# Patient Record
Sex: FEMALE | Race: WHITE | NOT HISPANIC OR LATINO | Employment: OTHER | URBAN - METROPOLITAN AREA
[De-identification: names, ages, dates, MRNs, and addresses within clinical notes are randomized per-mention and may not be internally consistent; named-entity substitution may affect disease eponyms.]

---

## 2020-02-26 ENCOUNTER — APPOINTMENT (OUTPATIENT)
Dept: GENERAL RADIOLOGY | Facility: HOSPITAL | Age: 80
End: 2020-02-26

## 2020-02-26 ENCOUNTER — APPOINTMENT (OUTPATIENT)
Dept: CT IMAGING | Facility: HOSPITAL | Age: 80
End: 2020-02-26

## 2020-02-26 ENCOUNTER — HOSPITAL ENCOUNTER (EMERGENCY)
Facility: HOSPITAL | Age: 80
Discharge: HOME | End: 2020-02-26
Attending: EMERGENCY MEDICINE | Admitting: EMERGENCY MEDICINE

## 2020-02-26 VITALS
TEMPERATURE: 98.9 F | BODY MASS INDEX: 22.29 KG/M2 | OXYGEN SATURATION: 93 % | HEART RATE: 76 BPM | HEIGHT: 67 IN | WEIGHT: 142 LBS | SYSTOLIC BLOOD PRESSURE: 131 MMHG | DIASTOLIC BLOOD PRESSURE: 67 MMHG | RESPIRATION RATE: 12 BRPM

## 2020-02-26 DIAGNOSIS — J18.9 COMMUNITY ACQUIRED PNEUMONIA, UNSPECIFIED LATERALITY: ICD-10-CM

## 2020-02-26 DIAGNOSIS — R93.89 ABNORMAL CHEST CT: ICD-10-CM

## 2020-02-26 DIAGNOSIS — N30.90 CYSTITIS: Primary | ICD-10-CM

## 2020-02-26 LAB
ALBUMIN SERPL-MCNC: 3.7 G/DL (ref 3.5–5.2)
ALBUMIN/GLOB SERPL: 0.9 G/DL
ALP SERPL-CCNC: 99 U/L (ref 39–117)
ALT SERPL W P-5'-P-CCNC: 20 U/L (ref 1–33)
ANION GAP SERPL CALCULATED.3IONS-SCNC: 11.1 MMOL/L (ref 5–15)
AST SERPL-CCNC: 26 U/L (ref 1–32)
BACTERIA UR QL AUTO: ABNORMAL /HPF
BASOPHILS # BLD AUTO: 0.01 10*3/MM3 (ref 0–0.2)
BASOPHILS NFR BLD AUTO: 0.1 % (ref 0–1.5)
BILIRUB SERPL-MCNC: 0.4 MG/DL (ref 0.2–1.2)
BILIRUB UR QL STRIP: NEGATIVE
BUN BLD-MCNC: 26 MG/DL (ref 8–23)
BUN/CREAT SERPL: 31 (ref 7–25)
CALCIUM SPEC-SCNC: 8.9 MG/DL (ref 8.6–10.5)
CHLORIDE SERPL-SCNC: 101 MMOL/L (ref 98–107)
CLARITY UR: ABNORMAL
CO2 SERPL-SCNC: 28.9 MMOL/L (ref 22–29)
COLOR UR: YELLOW
CREAT BLD-MCNC: 0.84 MG/DL (ref 0.57–1)
DEPRECATED RDW RBC AUTO: 43.3 FL (ref 37–54)
EOSINOPHIL # BLD AUTO: 0.13 10*3/MM3 (ref 0–0.4)
EOSINOPHIL NFR BLD AUTO: 1.9 % (ref 0.3–6.2)
ERYTHROCYTE [DISTWIDTH] IN BLOOD BY AUTOMATED COUNT: 13.7 % (ref 12.3–15.4)
GFR SERPL CREATININE-BSD FRML MDRD: 66 ML/MIN/1.73
GLOBULIN UR ELPH-MCNC: 4.1 GM/DL
GLUCOSE BLD-MCNC: 103 MG/DL (ref 65–99)
GLUCOSE BLDC GLUCOMTR-MCNC: 84 MG/DL (ref 70–130)
GLUCOSE UR STRIP-MCNC: NEGATIVE MG/DL
HCT VFR BLD AUTO: 41.2 % (ref 34–46.6)
HGB BLD-MCNC: 13.4 G/DL (ref 12–15.9)
HGB UR QL STRIP.AUTO: NEGATIVE
HOLD SPECIMEN: NORMAL
HOLD SPECIMEN: NORMAL
HYALINE CASTS UR QL AUTO: ABNORMAL /LPF
IMM GRANULOCYTES # BLD AUTO: 0.04 10*3/MM3 (ref 0–0.05)
IMM GRANULOCYTES NFR BLD AUTO: 0.6 % (ref 0–0.5)
KETONES UR QL STRIP: NEGATIVE
LEUKOCYTE ESTERASE UR QL STRIP.AUTO: ABNORMAL
LYMPHOCYTES # BLD AUTO: 1.43 10*3/MM3 (ref 0.7–3.1)
LYMPHOCYTES NFR BLD AUTO: 20.8 % (ref 19.6–45.3)
MAGNESIUM SERPL-MCNC: 2.3 MG/DL (ref 1.6–2.4)
MCH RBC QN AUTO: 28.6 PG (ref 26.6–33)
MCHC RBC AUTO-ENTMCNC: 32.5 G/DL (ref 31.5–35.7)
MCV RBC AUTO: 87.8 FL (ref 79–97)
MONOCYTES # BLD AUTO: 0.85 10*3/MM3 (ref 0.1–0.9)
MONOCYTES NFR BLD AUTO: 12.4 % (ref 5–12)
NEUTROPHILS # BLD AUTO: 4.41 10*3/MM3 (ref 1.7–7)
NEUTROPHILS NFR BLD AUTO: 64.2 % (ref 42.7–76)
NITRITE UR QL STRIP: POSITIVE
NRBC BLD AUTO-RTO: 0 /100 WBC (ref 0–0.2)
PH UR STRIP.AUTO: 8 [PH] (ref 5–8)
PLATELET # BLD AUTO: 229 10*3/MM3 (ref 140–450)
PMV BLD AUTO: 10.2 FL (ref 6–12)
POTASSIUM BLD-SCNC: 4.1 MMOL/L (ref 3.5–5.2)
PROT SERPL-MCNC: 7.8 G/DL (ref 6–8.5)
PROT UR QL STRIP: ABNORMAL
RBC # BLD AUTO: 4.69 10*6/MM3 (ref 3.77–5.28)
RBC # UR: ABNORMAL /HPF
REF LAB TEST METHOD: ABNORMAL
SODIUM BLD-SCNC: 141 MMOL/L (ref 136–145)
SP GR UR STRIP: 1.02 (ref 1–1.03)
SQUAMOUS #/AREA URNS HPF: ABNORMAL /HPF
TROPONIN T SERPL-MCNC: 0.01 NG/ML (ref 0–0.03)
UROBILINOGEN UR QL STRIP: ABNORMAL
VALPROATE SERPL-MCNC: 45 MCG/ML (ref 50–125)
WBC NRBC COR # BLD: 6.87 10*3/MM3 (ref 3.4–10.8)
WBC UR QL AUTO: ABNORMAL /HPF
WHOLE BLOOD HOLD SPECIMEN: NORMAL
WHOLE BLOOD HOLD SPECIMEN: NORMAL

## 2020-02-26 PROCEDURE — 25010000002 CEFTRIAXONE PER 250 MG: Performed by: EMERGENCY MEDICINE

## 2020-02-26 PROCEDURE — 80053 COMPREHEN METABOLIC PANEL: CPT | Performed by: EMERGENCY MEDICINE

## 2020-02-26 PROCEDURE — 71045 X-RAY EXAM CHEST 1 VIEW: CPT

## 2020-02-26 PROCEDURE — 80164 ASSAY DIPROPYLACETIC ACD TOT: CPT | Performed by: EMERGENCY MEDICINE

## 2020-02-26 PROCEDURE — 82962 GLUCOSE BLOOD TEST: CPT

## 2020-02-26 PROCEDURE — 87186 SC STD MICRODIL/AGAR DIL: CPT | Performed by: EMERGENCY MEDICINE

## 2020-02-26 PROCEDURE — 72125 CT NECK SPINE W/O DYE: CPT

## 2020-02-26 PROCEDURE — 87086 URINE CULTURE/COLONY COUNT: CPT | Performed by: EMERGENCY MEDICINE

## 2020-02-26 PROCEDURE — 71250 CT THORAX DX C-: CPT

## 2020-02-26 PROCEDURE — 93010 ELECTROCARDIOGRAM REPORT: CPT | Performed by: INTERNAL MEDICINE

## 2020-02-26 PROCEDURE — 84484 ASSAY OF TROPONIN QUANT: CPT | Performed by: EMERGENCY MEDICINE

## 2020-02-26 PROCEDURE — 93005 ELECTROCARDIOGRAM TRACING: CPT | Performed by: EMERGENCY MEDICINE

## 2020-02-26 PROCEDURE — 70450 CT HEAD/BRAIN W/O DYE: CPT

## 2020-02-26 PROCEDURE — 85025 COMPLETE CBC W/AUTO DIFF WBC: CPT | Performed by: EMERGENCY MEDICINE

## 2020-02-26 PROCEDURE — 87088 URINE BACTERIA CULTURE: CPT | Performed by: EMERGENCY MEDICINE

## 2020-02-26 PROCEDURE — 99284 EMERGENCY DEPT VISIT MOD MDM: CPT

## 2020-02-26 PROCEDURE — 81001 URINALYSIS AUTO W/SCOPE: CPT | Performed by: EMERGENCY MEDICINE

## 2020-02-26 PROCEDURE — 83735 ASSAY OF MAGNESIUM: CPT | Performed by: EMERGENCY MEDICINE

## 2020-02-26 PROCEDURE — 96365 THER/PROPH/DIAG IV INF INIT: CPT

## 2020-02-26 RX ORDER — TRAMADOL HYDROCHLORIDE 50 MG/1
50 TABLET ORAL 3 TIMES DAILY PRN
Status: ON HOLD | COMMUNITY
End: 2020-12-08

## 2020-02-26 RX ORDER — FUROSEMIDE 20 MG/1
20 TABLET ORAL DAILY
COMMUNITY
End: 2020-12-12 | Stop reason: HOSPADM

## 2020-02-26 RX ORDER — DIVALPROEX SODIUM 125 MG/1
125 TABLET, DELAYED RELEASE ORAL EVERY EVENING
Status: ON HOLD | COMMUNITY
End: 2020-12-08

## 2020-02-26 RX ORDER — DIPHENHYDRAMINE HCL 25 MG
25 TABLET ORAL EVERY 6 HOURS PRN
Status: ON HOLD | COMMUNITY
End: 2020-12-08

## 2020-02-26 RX ORDER — CEFTRIAXONE SODIUM 1 G/50ML
1 INJECTION, SOLUTION INTRAVENOUS ONCE
Status: COMPLETED | OUTPATIENT
Start: 2020-02-26 | End: 2020-02-26

## 2020-02-26 RX ORDER — TRAZODONE HYDROCHLORIDE 100 MG/1
100 TABLET ORAL NIGHTLY
Status: ON HOLD | COMMUNITY
End: 2020-12-08

## 2020-02-26 RX ORDER — POTASSIUM CHLORIDE 750 MG/1
10 CAPSULE, EXTENDED RELEASE ORAL DAILY
COMMUNITY
End: 2020-12-12 | Stop reason: HOSPADM

## 2020-02-26 RX ORDER — OLANZAPINE 10 MG/1
10 TABLET ORAL
COMMUNITY

## 2020-02-26 RX ORDER — CEFDINIR 300 MG/1
300 CAPSULE ORAL 2 TIMES DAILY
Qty: 14 CAPSULE | Refills: 0 | Status: SHIPPED | OUTPATIENT
Start: 2020-02-26 | End: 2020-03-04

## 2020-02-26 RX ORDER — CELECOXIB 200 MG/1
200 CAPSULE ORAL DAILY
Status: ON HOLD | COMMUNITY
End: 2020-12-08

## 2020-02-26 RX ORDER — SODIUM CHLORIDE 0.9 % (FLUSH) 0.9 %
10 SYRINGE (ML) INJECTION AS NEEDED
Status: DISCONTINUED | OUTPATIENT
Start: 2020-02-26 | End: 2020-02-26 | Stop reason: HOSPADM

## 2020-02-26 RX ORDER — DIVALPROEX SODIUM 250 MG/1
250 TABLET, DELAYED RELEASE ORAL
COMMUNITY

## 2020-02-26 RX ORDER — OLANZAPINE 5 MG/1
5 TABLET ORAL NIGHTLY
Status: ON HOLD | COMMUNITY
End: 2020-12-08

## 2020-02-26 RX ADMIN — SODIUM CHLORIDE 500 ML: 9 INJECTION, SOLUTION INTRAVENOUS at 16:13

## 2020-02-26 RX ADMIN — CEFTRIAXONE SODIUM 1 G: 1 INJECTION, SOLUTION INTRAVENOUS at 16:13

## 2020-02-28 LAB — BACTERIA SPEC AEROBE CULT: ABNORMAL

## 2020-03-21 ENCOUNTER — LAB REQUISITION (OUTPATIENT)
Dept: LAB | Facility: HOSPITAL | Age: 80
End: 2020-03-21

## 2020-03-21 DIAGNOSIS — Z00.00 ROUTINE GENERAL MEDICAL EXAMINATION AT A HEALTH CARE FACILITY: ICD-10-CM

## 2020-03-21 LAB
ALBUMIN SERPL-MCNC: 3.6 G/DL (ref 3.5–5.2)
ALBUMIN/GLOB SERPL: 1.2 G/DL
ALP SERPL-CCNC: 81 U/L (ref 39–117)
ALT SERPL W P-5'-P-CCNC: 9 U/L (ref 1–33)
ANION GAP SERPL CALCULATED.3IONS-SCNC: 12.4 MMOL/L (ref 5–15)
AST SERPL-CCNC: 25 U/L (ref 1–32)
BASOPHILS # BLD AUTO: 0.02 10*3/MM3 (ref 0–0.2)
BASOPHILS NFR BLD AUTO: 0.1 % (ref 0–1.5)
BILIRUB SERPL-MCNC: 0.5 MG/DL (ref 0.2–1.2)
BUN BLD-MCNC: 21 MG/DL (ref 8–23)
BUN/CREAT SERPL: 20.6 (ref 7–25)
CALCIUM SPEC-SCNC: 8.3 MG/DL (ref 8.6–10.5)
CHLORIDE SERPL-SCNC: 98 MMOL/L (ref 98–107)
CO2 SERPL-SCNC: 24.6 MMOL/L (ref 22–29)
CREAT BLD-MCNC: 1.02 MG/DL (ref 0.57–1)
DEPRECATED RDW RBC AUTO: 45 FL (ref 37–54)
EOSINOPHIL # BLD AUTO: 0.01 10*3/MM3 (ref 0–0.4)
EOSINOPHIL NFR BLD AUTO: 0.1 % (ref 0.3–6.2)
ERYTHROCYTE [DISTWIDTH] IN BLOOD BY AUTOMATED COUNT: 14.4 % (ref 12.3–15.4)
GFR SERPL CREATININE-BSD FRML MDRD: 52 ML/MIN/1.73
GLOBULIN UR ELPH-MCNC: 3.1 GM/DL
GLUCOSE BLD-MCNC: 130 MG/DL (ref 65–99)
HCT VFR BLD AUTO: 38 % (ref 34–46.6)
HGB BLD-MCNC: 12.7 G/DL (ref 12–15.9)
LYMPHOCYTES # BLD AUTO: 0.68 10*3/MM3 (ref 0.7–3.1)
LYMPHOCYTES NFR BLD AUTO: 4.7 % (ref 19.6–45.3)
MCH RBC QN AUTO: 28.9 PG (ref 26.6–33)
MCHC RBC AUTO-ENTMCNC: 33.4 G/DL (ref 31.5–35.7)
MCV RBC AUTO: 86.6 FL (ref 79–97)
MONOCYTES # BLD AUTO: 1.3 10*3/MM3 (ref 0.1–0.9)
MONOCYTES NFR BLD AUTO: 9 % (ref 5–12)
NEUTROPHILS # BLD AUTO: 12.29 10*3/MM3 (ref 1.7–7)
NEUTROPHILS NFR BLD AUTO: 85.6 % (ref 42.7–76)
PLATELET # BLD AUTO: 140 10*3/MM3 (ref 140–450)
PMV BLD AUTO: 10.6 FL (ref 6–12)
POTASSIUM BLD-SCNC: 4.2 MMOL/L (ref 3.5–5.2)
PROT SERPL-MCNC: 6.7 G/DL (ref 6–8.5)
RBC # BLD AUTO: 4.39 10*6/MM3 (ref 3.77–5.28)
SODIUM BLD-SCNC: 135 MMOL/L (ref 136–145)
WBC NRBC COR # BLD: 14.37 10*3/MM3 (ref 3.4–10.8)

## 2020-03-21 PROCEDURE — 85025 COMPLETE CBC W/AUTO DIFF WBC: CPT

## 2020-03-21 PROCEDURE — 80053 COMPREHEN METABOLIC PANEL: CPT

## 2020-03-22 ENCOUNTER — LAB REQUISITION (OUTPATIENT)
Dept: LAB | Facility: HOSPITAL | Age: 80
End: 2020-03-22

## 2020-03-22 DIAGNOSIS — Z00.00 ROUTINE GENERAL MEDICAL EXAMINATION AT A HEALTH CARE FACILITY: ICD-10-CM

## 2020-03-22 LAB
BACTERIA UR QL AUTO: ABNORMAL /HPF
BILIRUB UR QL STRIP: NEGATIVE
CLARITY UR: ABNORMAL
COLOR UR: YELLOW
GLUCOSE UR STRIP-MCNC: NEGATIVE MG/DL
HGB UR QL STRIP.AUTO: NEGATIVE
HYALINE CASTS UR QL AUTO: ABNORMAL /LPF
KETONES UR QL STRIP: ABNORMAL
LEUKOCYTE ESTERASE UR QL STRIP.AUTO: ABNORMAL
NITRITE UR QL STRIP: POSITIVE
PH UR STRIP.AUTO: 5.5 [PH] (ref 5–8)
PROT UR QL STRIP: NEGATIVE
RBC # UR: ABNORMAL /HPF
REF LAB TEST METHOD: ABNORMAL
SP GR UR STRIP: >=1.03 (ref 1–1.03)
SQUAMOUS #/AREA URNS HPF: ABNORMAL /HPF
UROBILINOGEN UR QL STRIP: ABNORMAL
WBC UR QL AUTO: ABNORMAL /HPF

## 2020-03-22 PROCEDURE — 81001 URINALYSIS AUTO W/SCOPE: CPT

## 2020-12-08 ENCOUNTER — HOSPITAL ENCOUNTER (INPATIENT)
Facility: HOSPITAL | Age: 80
LOS: 4 days | Discharge: HOME | End: 2020-12-12
Attending: EMERGENCY MEDICINE | Admitting: HOSPITALIST

## 2020-12-08 ENCOUNTER — APPOINTMENT (OUTPATIENT)
Dept: GENERAL RADIOLOGY | Facility: HOSPITAL | Age: 80
End: 2020-12-08

## 2020-12-08 DIAGNOSIS — N39.0 URINARY TRACT INFECTION WITHOUT HEMATURIA, SITE UNSPECIFIED: ICD-10-CM

## 2020-12-08 DIAGNOSIS — E87.0 HYPERNATREMIA: ICD-10-CM

## 2020-12-08 DIAGNOSIS — F03.91 DEMENTIA WITH BEHAVIORAL DISTURBANCE, UNSPECIFIED DEMENTIA TYPE: ICD-10-CM

## 2020-12-08 DIAGNOSIS — U07.1 COVID-19 VIRUS INFECTION: Primary | ICD-10-CM

## 2020-12-08 PROBLEM — G93.41 METABOLIC ENCEPHALOPATHY: Status: ACTIVE | Noted: 2020-12-08

## 2020-12-08 PROBLEM — N30.00 ACUTE CYSTITIS WITHOUT HEMATURIA: Status: ACTIVE | Noted: 2020-12-08

## 2020-12-08 PROBLEM — F03.90 DEMENTIA WITHOUT BEHAVIORAL DISTURBANCE (HCC): Status: ACTIVE | Noted: 2020-12-08

## 2020-12-08 PROBLEM — E86.0 DEHYDRATION: Status: ACTIVE | Noted: 2020-12-08

## 2020-12-08 LAB
ALBUMIN SERPL-MCNC: 3.1 G/DL (ref 3.5–5.2)
ALBUMIN/GLOB SERPL: 0.6 G/DL
ALP SERPL-CCNC: 85 U/L (ref 39–117)
ALT SERPL W P-5'-P-CCNC: 75 U/L (ref 1–33)
ANION GAP SERPL CALCULATED.3IONS-SCNC: 10.7 MMOL/L (ref 5–15)
AST SERPL-CCNC: 83 U/L (ref 1–32)
B PARAPERT DNA SPEC QL NAA+PROBE: NOT DETECTED
B PERT DNA SPEC QL NAA+PROBE: NOT DETECTED
BACTERIA UR QL AUTO: ABNORMAL /HPF
BASOPHILS # BLD AUTO: 0.03 10*3/MM3 (ref 0–0.2)
BASOPHILS NFR BLD AUTO: 0.3 % (ref 0–1.5)
BILIRUB SERPL-MCNC: 0.5 MG/DL (ref 0–1.2)
BILIRUB UR QL STRIP: NEGATIVE
BUN SERPL-MCNC: 48 MG/DL (ref 8–23)
BUN/CREAT SERPL: 48.5 (ref 7–25)
C PNEUM DNA NPH QL NAA+NON-PROBE: NOT DETECTED
CALCIUM SPEC-SCNC: 9.1 MG/DL (ref 8.6–10.5)
CHLORIDE SERPL-SCNC: 119 MMOL/L (ref 98–107)
CLARITY UR: ABNORMAL
CO2 SERPL-SCNC: 28.3 MMOL/L (ref 22–29)
COLOR UR: YELLOW
CREAT SERPL-MCNC: 0.99 MG/DL (ref 0.57–1)
DEPRECATED RDW RBC AUTO: 39.8 FL (ref 37–54)
EOSINOPHIL # BLD AUTO: 0.01 10*3/MM3 (ref 0–0.4)
EOSINOPHIL NFR BLD AUTO: 0.1 % (ref 0.3–6.2)
ERYTHROCYTE [DISTWIDTH] IN BLOOD BY AUTOMATED COUNT: 12.9 % (ref 12.3–15.4)
FLUAV SUBTYP SPEC NAA+PROBE: NOT DETECTED
FLUBV RNA ISLT QL NAA+PROBE: NOT DETECTED
GFR SERPL CREATININE-BSD FRML MDRD: 54 ML/MIN/1.73
GLOBULIN UR ELPH-MCNC: 4.9 GM/DL
GLUCOSE SERPL-MCNC: 101 MG/DL (ref 65–99)
GLUCOSE UR STRIP-MCNC: NEGATIVE MG/DL
HADV DNA SPEC NAA+PROBE: NOT DETECTED
HCOV 229E RNA SPEC QL NAA+PROBE: NOT DETECTED
HCOV HKU1 RNA SPEC QL NAA+PROBE: NOT DETECTED
HCOV NL63 RNA SPEC QL NAA+PROBE: NOT DETECTED
HCOV OC43 RNA SPEC QL NAA+PROBE: NOT DETECTED
HCT VFR BLD AUTO: 49.6 % (ref 34–46.6)
HGB BLD-MCNC: 16.2 G/DL (ref 12–15.9)
HGB UR QL STRIP.AUTO: ABNORMAL
HMPV RNA NPH QL NAA+NON-PROBE: NOT DETECTED
HPIV1 RNA SPEC QL NAA+PROBE: NOT DETECTED
HPIV2 RNA SPEC QL NAA+PROBE: NOT DETECTED
HPIV3 RNA NPH QL NAA+PROBE: NOT DETECTED
HPIV4 P GENE NPH QL NAA+PROBE: NOT DETECTED
HYALINE CASTS UR QL AUTO: ABNORMAL /LPF
IMM GRANULOCYTES # BLD AUTO: 0.09 10*3/MM3 (ref 0–0.05)
IMM GRANULOCYTES NFR BLD AUTO: 0.8 % (ref 0–0.5)
KETONES UR QL STRIP: NEGATIVE
LEUKOCYTE ESTERASE UR QL STRIP.AUTO: ABNORMAL
LYMPHOCYTES # BLD AUTO: 1.99 10*3/MM3 (ref 0.7–3.1)
LYMPHOCYTES NFR BLD AUTO: 18.6 % (ref 19.6–45.3)
M PNEUMO IGG SER IA-ACNC: NOT DETECTED
MCH RBC QN AUTO: 28.4 PG (ref 26.6–33)
MCHC RBC AUTO-ENTMCNC: 32.7 G/DL (ref 31.5–35.7)
MCV RBC AUTO: 87 FL (ref 79–97)
MONOCYTES # BLD AUTO: 1.19 10*3/MM3 (ref 0.1–0.9)
MONOCYTES NFR BLD AUTO: 11.1 % (ref 5–12)
NEUTROPHILS NFR BLD AUTO: 69.1 % (ref 42.7–76)
NEUTROPHILS NFR BLD AUTO: 7.38 10*3/MM3 (ref 1.7–7)
NITRITE UR QL STRIP: NEGATIVE
NRBC BLD AUTO-RTO: 0.1 /100 WBC (ref 0–0.2)
PH UR STRIP.AUTO: <=5 [PH] (ref 5–8)
PLATELET # BLD AUTO: 242 10*3/MM3 (ref 140–450)
PMV BLD AUTO: 10.4 FL (ref 6–12)
POTASSIUM SERPL-SCNC: 4.5 MMOL/L (ref 3.5–5.2)
PROT SERPL-MCNC: 8 G/DL (ref 6–8.5)
PROT UR QL STRIP: NEGATIVE
RBC # BLD AUTO: 5.7 10*6/MM3 (ref 3.77–5.28)
RBC # UR: ABNORMAL /HPF
REF LAB TEST METHOD: ABNORMAL
RHINOVIRUS RNA SPEC NAA+PROBE: NOT DETECTED
RSV RNA NPH QL NAA+NON-PROBE: NOT DETECTED
SARS-COV-2 RNA NPH QL NAA+NON-PROBE: DETECTED
SODIUM SERPL-SCNC: 158 MMOL/L (ref 136–145)
SP GR UR STRIP: 1.02 (ref 1–1.03)
SQUAMOUS #/AREA URNS HPF: ABNORMAL /HPF
UROBILINOGEN UR QL STRIP: ABNORMAL
VALPROATE SERPL-MCNC: 19 MCG/ML (ref 50–125)
WBC # BLD AUTO: 10.69 10*3/MM3 (ref 3.4–10.8)
WBC UR QL AUTO: ABNORMAL /HPF

## 2020-12-08 PROCEDURE — 85025 COMPLETE CBC W/AUTO DIFF WBC: CPT | Performed by: EMERGENCY MEDICINE

## 2020-12-08 PROCEDURE — 0202U NFCT DS 22 TRGT SARS-COV-2: CPT | Performed by: EMERGENCY MEDICINE

## 2020-12-08 PROCEDURE — 87086 URINE CULTURE/COLONY COUNT: CPT | Performed by: EMERGENCY MEDICINE

## 2020-12-08 PROCEDURE — 87088 URINE BACTERIA CULTURE: CPT | Performed by: EMERGENCY MEDICINE

## 2020-12-08 PROCEDURE — P9612 CATHETERIZE FOR URINE SPEC: HCPCS

## 2020-12-08 PROCEDURE — 80053 COMPREHEN METABOLIC PANEL: CPT | Performed by: EMERGENCY MEDICINE

## 2020-12-08 PROCEDURE — 81001 URINALYSIS AUTO W/SCOPE: CPT | Performed by: EMERGENCY MEDICINE

## 2020-12-08 PROCEDURE — 80164 ASSAY DIPROPYLACETIC ACD TOT: CPT | Performed by: EMERGENCY MEDICINE

## 2020-12-08 PROCEDURE — 71045 X-RAY EXAM CHEST 1 VIEW: CPT

## 2020-12-08 PROCEDURE — 87186 SC STD MICRODIL/AGAR DIL: CPT | Performed by: EMERGENCY MEDICINE

## 2020-12-08 PROCEDURE — 36415 COLL VENOUS BLD VENIPUNCTURE: CPT

## 2020-12-08 PROCEDURE — 99285 EMERGENCY DEPT VISIT HI MDM: CPT

## 2020-12-08 RX ORDER — CEFTRIAXONE SODIUM 1 G/50ML
1 INJECTION, SOLUTION INTRAVENOUS EVERY 24 HOURS
Status: DISCONTINUED | OUTPATIENT
Start: 2020-12-09 | End: 2020-12-10

## 2020-12-08 RX ORDER — DEXTROSE, SODIUM CHLORIDE, AND POTASSIUM CHLORIDE 5; .45; .075 G/100ML; G/100ML; G/100ML
125 INJECTION INTRAVENOUS CONTINUOUS
Status: DISCONTINUED | OUTPATIENT
Start: 2020-12-08 | End: 2020-12-09

## 2020-12-08 RX ORDER — LANOLIN ALCOHOL/MO/W.PET/CERES
1000 CREAM (GRAM) TOPICAL DAILY
COMMUNITY

## 2020-12-08 RX ORDER — ONDANSETRON 4 MG/1
4 TABLET, FILM COATED ORAL EVERY 4 HOURS PRN
Status: DISCONTINUED | OUTPATIENT
Start: 2020-12-08 | End: 2020-12-12 | Stop reason: HOSPADM

## 2020-12-08 RX ORDER — FOLIC ACID 1 MG/1
1 TABLET ORAL DAILY
COMMUNITY

## 2020-12-08 RX ORDER — ACETAMINOPHEN 325 MG/1
650 TABLET ORAL EVERY 4 HOURS PRN
Status: DISCONTINUED | OUTPATIENT
Start: 2020-12-08 | End: 2020-12-12 | Stop reason: HOSPADM

## 2020-12-08 RX ORDER — FAMOTIDINE 20 MG/1
20 TABLET, FILM COATED ORAL DAILY
Status: DISCONTINUED | OUTPATIENT
Start: 2020-12-09 | End: 2020-12-08

## 2020-12-08 RX ORDER — ZINC OXIDE 20 %
OINTMENT (GRAM) TOPICAL 2 TIMES DAILY
COMMUNITY

## 2020-12-08 RX ORDER — ONDANSETRON 2 MG/ML
4 INJECTION INTRAMUSCULAR; INTRAVENOUS EVERY 4 HOURS PRN
Status: DISCONTINUED | OUTPATIENT
Start: 2020-12-08 | End: 2020-12-12 | Stop reason: HOSPADM

## 2020-12-08 RX ORDER — SODIUM CHLORIDE 0.9 % (FLUSH) 0.9 %
10 SYRINGE (ML) INJECTION AS NEEDED
Status: DISCONTINUED | OUTPATIENT
Start: 2020-12-08 | End: 2020-12-08

## 2020-12-08 RX ORDER — MIRTAZAPINE 15 MG/1
7.5 TABLET, FILM COATED ORAL NIGHTLY
COMMUNITY

## 2020-12-08 RX ORDER — CEFTRIAXONE SODIUM 1 G/50ML
1 INJECTION, SOLUTION INTRAVENOUS ONCE
Status: COMPLETED | OUTPATIENT
Start: 2020-12-08 | End: 2020-12-09

## 2020-12-08 RX ORDER — UREA 10 %
3 LOTION (ML) TOPICAL NIGHTLY PRN
Status: DISCONTINUED | OUTPATIENT
Start: 2020-12-08 | End: 2020-12-12 | Stop reason: HOSPADM

## 2020-12-08 RX ORDER — FEXOFENADINE HCL 180 MG/1
180 TABLET ORAL DAILY
COMMUNITY
End: 2020-12-12 | Stop reason: HOSPADM

## 2020-12-08 RX ORDER — NITROGLYCERIN 0.4 MG/1
0.4 TABLET SUBLINGUAL
Status: DISCONTINUED | OUTPATIENT
Start: 2020-12-08 | End: 2020-12-12 | Stop reason: HOSPADM

## 2020-12-08 RX ORDER — ACETAMINOPHEN 500 MG
1000 TABLET ORAL 3 TIMES DAILY PRN
COMMUNITY

## 2020-12-08 NOTE — ED NOTES
Patient to er per ems from nursing home with c/o increasing in confusion and mental status change over the last two days with fever. Reported base line of dementia per report. Patient has mask on in triage along with staff.      Kareem Melendez, RN  12/08/20 5360

## 2020-12-09 LAB
ALBUMIN SERPL-MCNC: 2.8 G/DL (ref 3.5–5.2)
ALBUMIN/GLOB SERPL: 0.7 G/DL
ALP SERPL-CCNC: 72 U/L (ref 39–117)
ALT SERPL W P-5'-P-CCNC: 59 U/L (ref 1–33)
ANION GAP SERPL CALCULATED.3IONS-SCNC: 9.1 MMOL/L (ref 5–15)
ANION GAP SERPL CALCULATED.3IONS-SCNC: 9.6 MMOL/L (ref 5–15)
AST SERPL-CCNC: 61 U/L (ref 1–32)
BASOPHILS # BLD AUTO: 0.02 10*3/MM3 (ref 0–0.2)
BASOPHILS NFR BLD AUTO: 0.2 % (ref 0–1.5)
BILIRUB SERPL-MCNC: 0.3 MG/DL (ref 0–1.2)
BUN SERPL-MCNC: 44 MG/DL (ref 8–23)
BUN SERPL-MCNC: 53 MG/DL (ref 8–23)
BUN/CREAT SERPL: 54.3 (ref 7–25)
BUN/CREAT SERPL: 63.1 (ref 7–25)
CALCIUM SPEC-SCNC: 8.5 MG/DL (ref 8.6–10.5)
CALCIUM SPEC-SCNC: 8.7 MG/DL (ref 8.6–10.5)
CHLORIDE SERPL-SCNC: 118 MMOL/L (ref 98–107)
CHLORIDE SERPL-SCNC: 122 MMOL/L (ref 98–107)
CK SERPL-CCNC: 296 U/L (ref 20–180)
CO2 SERPL-SCNC: 25.9 MMOL/L (ref 22–29)
CO2 SERPL-SCNC: 26.4 MMOL/L (ref 22–29)
CREAT SERPL-MCNC: 0.81 MG/DL (ref 0.57–1)
CREAT SERPL-MCNC: 0.84 MG/DL (ref 0.57–1)
CRP SERPL-MCNC: 17.79 MG/DL (ref 0–0.5)
D DIMER PPP FEU-MCNC: 1.28 MCGFEU/ML (ref 0–0.49)
D-LACTATE SERPL-SCNC: 1.3 MMOL/L (ref 0.5–2)
DEPRECATED RDW RBC AUTO: 43 FL (ref 37–54)
EOSINOPHIL # BLD AUTO: 0.05 10*3/MM3 (ref 0–0.4)
EOSINOPHIL NFR BLD AUTO: 0.5 % (ref 0.3–6.2)
ERYTHROCYTE [DISTWIDTH] IN BLOOD BY AUTOMATED COUNT: 13.2 % (ref 12.3–15.4)
FERRITIN SERPL-MCNC: 1305 NG/ML (ref 13–150)
FIBRINOGEN PPP-MCNC: 1013 MG/DL (ref 219–464)
GFR SERPL CREATININE-BSD FRML MDRD: 65 ML/MIN/1.73
GFR SERPL CREATININE-BSD FRML MDRD: 68 ML/MIN/1.73
GLOBULIN UR ELPH-MCNC: 4.3 GM/DL
GLUCOSE SERPL-MCNC: 130 MG/DL (ref 65–99)
GLUCOSE SERPL-MCNC: 166 MG/DL (ref 65–99)
HCT VFR BLD AUTO: 41.5 % (ref 34–46.6)
HGB BLD-MCNC: 13.2 G/DL (ref 12–15.9)
IMM GRANULOCYTES # BLD AUTO: 0.09 10*3/MM3 (ref 0–0.05)
IMM GRANULOCYTES NFR BLD AUTO: 0.9 % (ref 0–0.5)
LDH SERPL-CCNC: 238 U/L (ref 135–214)
LYMPHOCYTES # BLD AUTO: 1.62 10*3/MM3 (ref 0.7–3.1)
LYMPHOCYTES NFR BLD AUTO: 15.8 % (ref 19.6–45.3)
MCH RBC QN AUTO: 28.3 PG (ref 26.6–33)
MCHC RBC AUTO-ENTMCNC: 31.8 G/DL (ref 31.5–35.7)
MCV RBC AUTO: 88.9 FL (ref 79–97)
MONOCYTES # BLD AUTO: 0.92 10*3/MM3 (ref 0.1–0.9)
MONOCYTES NFR BLD AUTO: 9 % (ref 5–12)
NEUTROPHILS NFR BLD AUTO: 7.53 10*3/MM3 (ref 1.7–7)
NEUTROPHILS NFR BLD AUTO: 73.6 % (ref 42.7–76)
NRBC BLD AUTO-RTO: 0 /100 WBC (ref 0–0.2)
PLATELET # BLD AUTO: 254 10*3/MM3 (ref 140–450)
PMV BLD AUTO: 10.8 FL (ref 6–12)
POTASSIUM SERPL-SCNC: 3.5 MMOL/L (ref 3.5–5.2)
POTASSIUM SERPL-SCNC: 3.7 MMOL/L (ref 3.5–5.2)
PROT SERPL-MCNC: 7.1 G/DL (ref 6–8.5)
RBC # BLD AUTO: 4.67 10*6/MM3 (ref 3.77–5.28)
SODIUM SERPL-SCNC: 153 MMOL/L (ref 136–145)
SODIUM SERPL-SCNC: 158 MMOL/L (ref 136–145)
TROPONIN T SERPL-MCNC: 0.08 NG/ML (ref 0–0.03)
WBC # BLD AUTO: 10.23 10*3/MM3 (ref 3.4–10.8)

## 2020-12-09 PROCEDURE — 80053 COMPREHEN METABOLIC PANEL: CPT | Performed by: HOSPITALIST

## 2020-12-09 PROCEDURE — 82550 ASSAY OF CK (CPK): CPT | Performed by: HOSPITALIST

## 2020-12-09 PROCEDURE — 85379 FIBRIN DEGRADATION QUANT: CPT | Performed by: HOSPITALIST

## 2020-12-09 PROCEDURE — 82728 ASSAY OF FERRITIN: CPT | Performed by: HOSPITALIST

## 2020-12-09 PROCEDURE — 85025 COMPLETE CBC W/AUTO DIFF WBC: CPT | Performed by: HOSPITALIST

## 2020-12-09 PROCEDURE — 25010000002 ENOXAPARIN PER 10 MG: Performed by: HOSPITALIST

## 2020-12-09 PROCEDURE — 92610 EVALUATE SWALLOWING FUNCTION: CPT

## 2020-12-09 PROCEDURE — 84484 ASSAY OF TROPONIN QUANT: CPT | Performed by: HOSPITALIST

## 2020-12-09 PROCEDURE — 83605 ASSAY OF LACTIC ACID: CPT | Performed by: HOSPITALIST

## 2020-12-09 PROCEDURE — 85384 FIBRINOGEN ACTIVITY: CPT | Performed by: HOSPITALIST

## 2020-12-09 PROCEDURE — 86140 C-REACTIVE PROTEIN: CPT | Performed by: HOSPITALIST

## 2020-12-09 PROCEDURE — 25010000002 CEFTRIAXONE PER 250 MG: Performed by: HOSPITALIST

## 2020-12-09 PROCEDURE — 83615 LACTATE (LD) (LDH) ENZYME: CPT | Performed by: HOSPITALIST

## 2020-12-09 RX ORDER — FOLIC ACID 1 MG/1
1 TABLET ORAL DAILY
Status: DISCONTINUED | OUTPATIENT
Start: 2020-12-09 | End: 2020-12-12 | Stop reason: HOSPADM

## 2020-12-09 RX ORDER — DIVALPROEX SODIUM 250 MG/1
250 TABLET, DELAYED RELEASE ORAL NIGHTLY
Status: DISCONTINUED | OUTPATIENT
Start: 2020-12-09 | End: 2020-12-12 | Stop reason: HOSPADM

## 2020-12-09 RX ORDER — CHOLECALCIFEROL (VITAMIN D3) 125 MCG
1000 CAPSULE ORAL DAILY
Status: DISCONTINUED | OUTPATIENT
Start: 2020-12-09 | End: 2020-12-12 | Stop reason: HOSPADM

## 2020-12-09 RX ORDER — MIRTAZAPINE 15 MG/1
7.5 TABLET, FILM COATED ORAL NIGHTLY
Status: DISCONTINUED | OUTPATIENT
Start: 2020-12-09 | End: 2020-12-12 | Stop reason: HOSPADM

## 2020-12-09 RX ORDER — OLANZAPINE 10 MG/1
10 TABLET ORAL NIGHTLY
Status: DISCONTINUED | OUTPATIENT
Start: 2020-12-09 | End: 2020-12-12 | Stop reason: HOSPADM

## 2020-12-09 RX ORDER — DEXTROSE, SODIUM CHLORIDE, AND POTASSIUM CHLORIDE 5; .45; .075 G/100ML; G/100ML; G/100ML
75 INJECTION INTRAVENOUS CONTINUOUS
Status: DISCONTINUED | OUTPATIENT
Start: 2020-12-10 | End: 2020-12-10

## 2020-12-09 RX ADMIN — DIVALPROEX SODIUM 250 MG: 250 TABLET, DELAYED RELEASE ORAL at 20:46

## 2020-12-09 RX ADMIN — FOLIC ACID 1 MG: 1 TABLET ORAL at 08:39

## 2020-12-09 RX ADMIN — CEFTRIAXONE SODIUM 1 G: 1 INJECTION, SOLUTION INTRAVENOUS at 20:46

## 2020-12-09 RX ADMIN — POTASSIUM CHLORIDE, DEXTROSE MONOHYDRATE AND SODIUM CHLORIDE 125 ML/HR: 75; 5; 450 INJECTION, SOLUTION INTRAVENOUS at 23:43

## 2020-12-09 RX ADMIN — OLANZAPINE 10 MG: 10 TABLET ORAL at 20:46

## 2020-12-09 RX ADMIN — POTASSIUM CHLORIDE, DEXTROSE MONOHYDRATE AND SODIUM CHLORIDE 125 ML/HR: 75; 5; 450 INJECTION, SOLUTION INTRAVENOUS at 13:55

## 2020-12-09 RX ADMIN — CEFTRIAXONE SODIUM 1 G: 1 INJECTION, SOLUTION INTRAVENOUS at 00:59

## 2020-12-09 RX ADMIN — POTASSIUM CHLORIDE, DEXTROSE MONOHYDRATE AND SODIUM CHLORIDE 125 ML/HR: 75; 5; 450 INJECTION, SOLUTION INTRAVENOUS at 00:56

## 2020-12-09 RX ADMIN — POTASSIUM CHLORIDE, DEXTROSE MONOHYDRATE AND SODIUM CHLORIDE 75 ML/HR: 75; 5; 450 INJECTION, SOLUTION INTRAVENOUS at 23:57

## 2020-12-09 RX ADMIN — ENOXAPARIN SODIUM 40 MG: 40 INJECTION SUBCUTANEOUS at 08:39

## 2020-12-09 RX ADMIN — Medication 1000 MCG: at 08:39

## 2020-12-09 NOTE — ED NOTES
"Spoke with Roxanne at [facility] via phone at [phone]. Roxanne reports pt has had a lack of appetite and a constant fever over 100.4F despite giving Tylenol for 2 days. Reports \"she is just not herself\". Requesting that we check for UTI. Pt hx of dementia and is normally oriented to herself only.      Abbi Downey RN  12/08/20 1915    "

## 2020-12-09 NOTE — ED NOTES
Pts O2 sat dropped to 88% on RA while sleeping. Pt placed on 2L NC.      Abbi Downey RN  12/08/20 2021

## 2020-12-09 NOTE — THERAPY EVALUATION
Acute Care - Speech Language Pathology   Swallow Initial Evaluation [facility]     Patient Name: Ena Lima  : 1940  MRN: [id number]  Today's Date: 2020               Admit Date: 2020    Visit Dx:     ICD-10-CM ICD-9-CM   1. COVID-19 virus infection  U07.1 079.89   2. Hypernatremia  E87.0 276.0   3. Urinary tract infection without hematuria, site unspecified  N39.0 599.0   4. Dementia with behavioral disturbance, unspecified dementia type (CMS/HCC)  F03.91 294.21     Patient Active Problem List   Diagnosis   • COVID-19 virus infection   • Acute cystitis without hematuria   • Metabolic encephalopathy   • Dehydration   • Hypernatremia   • Dementia without behavioral disturbance (CMS/HCC)     Past Medical History:   Diagnosis Date   • Arthritis    • Cellulitis    • Dementia (CMS/HCC)      Past Surgical History:   Procedure Laterality Date   • HERNIA REPAIR       PPE: Patient was not wearing a face mask during this therapy encounter secondary to swallow evaluation performed. Therapist used appropriate personal protective equipment including gown, eye protection, mask and gloves.  Mask used was N95/duckbill. Appropriate PPE was worn during the entire therapy session. The patient coughed during this evaluation. Therapist was within 6 feet for 15 minutes or more during the evaluation. Hand hygiene was completed before and after therapy session. Patient is in enhanced droplet precautions.        SWALLOW EVALUATION (last 72 hours)      SLP Adult Swallow Evaluation     Row Name 20 1000       Rehab Evaluation    Document Type  evaluation  -AB    Subjective Information  no complaints  -AB    Patient Observations  alert  -AB    Patient/Family/Caregiver Comments/Observations  Pt is awake, and does cooperate with all requests. Speech severely decreased in intensity, nonsensical. Following commands <25%  -AB    Patient Effort  adequate  -AB       General Information    Patient Profile Reviewed  yes  -AB     Pertinent History Of Current Problem  80 y.o. female admitted from [facility] w/COVID. PMH: dementia, oriented to self only at baseline. Spoke with pt [facility], where pt is on a regular and thin liquid diet.   -AB    Current Method of Nutrition  NPO  -AB    Precautions/Limitations, Vision  WFL;for purposes of eval  -AB    Precautions/Limitations, Hearing  WFL;for purposes of eval  -AB    Prior Level of Function-Communication  cognitive-linguistic impairment dementia  -AB    Prior Level of Function-Swallowing  no diet consistency restrictions;regular textures;thin liquids per ECF  -AB    Patient's Goals for Discharge  patient did not state  -AB       Pain    Additional Documentation  -- did not state  -AB       Oral Motor Structure and Function    Oral Lesions or Structural Abnormalities and/or variants  None  -AB    Dentition Assessment  natural, present and adequate;missing teeth  -AB    Secretion Management  dried secretions in oral cavity hot pink coating on teeth, lips, palate  -AB    Mucosal Quality  dry  -AB    Gag Response  absent or diminished  -AB    Volitional Swallow  WFL  -AB    Volitional Cough  weak  -AB       Oral Musculature and Cranial Nerve Assessment    Oral Motor General Assessment  unable to assess  -AB    Oral Motor, Comment  Pt does protrude tongue, and intermittently opens oral cavity. Structures at rest without overt impairments, unable to assess function as pt cannot follow commands.   -AB       Clinical Swallow Eval    Clinical Swallow Evaluation Summary  Bedside swallow evaluation completed with: water by toothette/tsp/straw, puree, and mechanical soft/mixed consistency. Oral care provided prior to PO trials as pt has hot pink coating on lips, teeth, and hard palate, question cosmetics or crushed medication residual. Oral stage moderately impaired. Pt demonstrates a bite reflex on toothette, straw, and spoon, requiring tactile removal through jaw opening as pt does not follow  commands. A-P transport of liquids and puree are timely and adequate. Mastication of mechanical soft solids is inefficient, munch pattern, without bolus formation and >4 minutes with eventual manual removal of bolus from L sided buccal cavity. Pt had demonstrated cough x2 during prolonged mastication of mech soft/mixed, suspicious for piecemeal deglutition and ineffective bolus control, further compounded by multiple swallows (x5) which do not clear bolus. Digital palpation performed and suggestive of adequate laryngeal elevation. No further overt s/s aspiration noted with thins in all presentation method or puree.   -AB       Clinical Impression    SLP Swallowing Diagnosis  moderate;oral dysphagia;suspected pharyngeal dysphagia  -AB    Functional Impact  risk of aspiration/pneumonia;risk of malnutrition  -AB    Rehab Potential/Prognosis, Swallowing  good, to achieve stated therapy goals  -AB    Swallow Criteria for Skilled Therapeutic Interventions Met  demonstrates skilled criteria  -AB       Recommendations    Therapy Frequency (Swallow)  PRN  -AB    Predicted Duration Therapy Intervention (Days)  until discharge  -AB    SLP Diet Recommendation  puree;thin liquids  -AB    Recommended Diagnostics  reassess via clinical swallow evaluation  -AB    Recommended Precautions and Strategies  upright posture during/after eating;small bites of food and sips of liquid;1:1 supervision;assist with feeding;check mouth frequently for oral residue/pocketing  -AB    Oral Care Recommendations  Oral Care before breakfast, after meals and PRN  -AB    SLP Rec. for Method of Medication Administration  meds crushed;with pudding or applesauce  -AB    Monitor for Signs of Aspiration  yes;notify SLP if any concerns  -AB    Anticipated Discharge Disposition (SLP)  extended care facility  -AB       Swallow Goals (SLP)    Oral Nutrition/Hydration Goal Selection (SLP)  oral nutrition/hydration, SLP goal 1  -AB       Oral Nutrition/Hydration  Goal 1 (SLP)    Oral Nutrition/Hydration Goal 1, SLP  Pt will initiate and tolerate L/R diet without oral stage difficulties or complications associated with aspiration   -AB    Time Frame (Oral Nutrition/Hydration Goal 1, SLP)  by discharge  -AB      User Key  (r) = Recorded By, (t) = Taken By, (c) = Cosigned By    Initials Name Effective Dates    AB Cuba Mojgan JORDY, MS CCC-SLP 10/05/20 -           EDUCATION  The patient has been educated in the following areas:   Dysphagia (Swallowing Impairment).    SLP Recommendation and Plan  SLP Swallowing Diagnosis: moderate, oral dysphagia, suspected pharyngeal dysphagia  SLP Diet Recommendation: puree, thin liquids  Recommended Precautions and Strategies: upright posture during/after eating, small bites of food and sips of liquid, 1:1 supervision, assist with feeding, check mouth frequently for oral residue/pocketing  SLP Rec. for Method of Medication Administration: meds crushed, with pudding or applesauce     Monitor for Signs of Aspiration: yes, notify SLP if any concerns  Recommended Diagnostics: reassess via clinical swallow evaluation  Swallow Criteria for Skilled Therapeutic Interventions Met: demonstrates skilled criteria  Anticipated Discharge Disposition (SLP): extended care facility  Rehab Potential/Prognosis, Swallowing: good, to achieve stated therapy goals  Therapy Frequency (Swallow): PRN  Predicted Duration Therapy Intervention (Days): until discharge                         Plan of Care Reviewed With: patient  Progress: no change  Outcome Summary: Bedside swallow evaluation completed. Pt demonstrates moderate oral dysphagia, with bite reflex and mechanical soft peaches manually removed from oral cavity. Overt s/s aspiration were noted with mixed consistency/mechanical soft solids. No further overt s/s aspiration with thins or puree. Recommend: initiation of puree and thin liquid diet. Medications crushed in puree. Compensations to include: full feed,  upright for all PO, small bites/sips, check mouth for oral pocketing (not noted with puree but does have oral difficulties with soft solids). SLP to follow up as appropriate for re-evaluation or tolerance as indicated.    SLP GOALS     Row Name 12/09/20 1000             Oral Nutrition/Hydration Goal 1 (SLP)    Oral Nutrition/Hydration Goal 1, SLP  Pt will initiate and tolerate L/R diet without oral stage difficulties or complications associated with aspiration   -AB      Time Frame (Oral Nutrition/Hydration Goal 1, SLP)  by discharge  -AB        User Key  (r) = Recorded By, (t) = Taken By, (c) = Cosigned By    Initials Name Provider Type    Mojgan Loyola MS CCC-SLP Speech and Language Pathologist           SLP Outcome Measures (last 72 hours)      SLP Outcome Measures     Row Name 12/09/20 1100             SLP Outcome Measures    Outcome Measure Used?  Adult NOMS  -AB         Adult FCM Scores    FCM Chosen  Swallowing  -AB      Swallowing FCM Score  4  -AB        User Key  (r) = Recorded By, (t) = Taken By, (c) = Cosigned By    Initials Name Effective Dates    Mojgan Loyola MS CCC-SLP 10/05/20 -            Time Calculation:   Time Calculation- SLP     Row Name 12/09/20 1124             Time Calculation- SLP    SLP Start Time  1000  -AB      SLP Received On  12/09/20  -AB        User Key  (r) = Recorded By, (t) = Taken By, (c) = Cosigned By    Initials Name Provider Type    Mojgan Loyola MS CCC-SLP Speech and Language Pathologist          Therapy Charges for Today     Code Description Service Date Service Provider Modifiers Qty    79114121545 HC ST EVAL ORAL PHARYNG SWALLOW 4 12/9/2020 Mojgan Cuba MS CCC-SLP GN 1               Mojgan Cuba MS CCC-MARCELLA  12/9/2020

## 2020-12-09 NOTE — H&P
Patient Name:  Ena Lima  YOB: 1940  MRN:  [id number]  Admit Date:  12/8/2020  Patient Care Team:  Tania Lam APRN as PCP - General (Family Medicine)      Subjective   History Present Illness     Chief Complaint   Patient presents with   • Altered Mental Status       Ms. Lima is a 80 y.o. female with a history of dementia from a NH that presents to [facility] complaining of worsening confusion.  Unclear exactly what her baseline mental status is like but at time of my examination she is essentially nonverbal and does not answer my questions.  She does not follow simple commands either.    History of Present Illness  Review of Systems   Unable to perform ROS: Dementia        Personal History     Past Medical History:   Diagnosis Date   • Arthritis    • Dementia (CMS/HCC)      Past Surgical History:   Procedure Laterality Date   • HERNIA REPAIR       No family history on file.  Social History     Tobacco Use   • Smoking status: Unknown If Ever Smoked   Substance Use Topics   • Alcohol use: Defer   • Drug use: Defer     No current facility-administered medications on file prior to encounter.      Current Outpatient Medications on File Prior to Encounter   Medication Sig Dispense Refill   • celecoxib (CeleBREX) 200 MG capsule Take 200 mg by mouth Daily.     • diphenhydrAMINE (BENADRYL ALLERGY) 25 MG tablet Take 25 mg by mouth Every 6 (Six) Hours As Needed for Itching.     • divalproex (DEPAKOTE) 125 MG DR tablet Take 125 mg by mouth Every Evening.     • divalproex (DEPAKOTE) 250 MG DR tablet Take 250 mg by mouth every night at bedtime.     • furosemide (LASIX) 20 MG tablet Take 20 mg by mouth Daily.     • mineral oil-hydrophilic petrolatum (AQUAPHOR) ointment Apply  topically to the appropriate area as directed As Needed for Dry Skin. To affected areas, bilateral legs before wrapping with Kerlix     • OLANZapine (zyPREXA) 10 MG tablet Take 10 mg by mouth every night  at bedtime.     • OLANZapine (zyPREXA) 5 MG tablet Take 5 mg by mouth Every Night.     • potassium chloride (MICRO-K) 10 MEQ CR capsule Take 10 mEq by mouth Daily.     • traMADol (ULTRAM) 50 MG tablet Take 50 mg by mouth 3 (Three) Times a Day As Needed for Moderate Pain .     • traZODone (DESYREL) 100 MG tablet Take 100 mg by mouth Every Night.       Allergies   Allergen Reactions   • Adhesive Tape Hives   • Sulfa Antibiotics Unknown - Low Severity       Objective    Objective     Vital Signs  Temp:  [98.2 °F (36.8 °C)] 98.2 °F (36.8 °C)  Heart Rate:  [77-87] 87  Resp:  [16-18] 16  BP: ()/(67-77) 94/67  SpO2:  [91 %-99 %] 99 %  on  Flow (L/min):  [2] 2;   Device (Oxygen Therapy): nasal cannula  There is no height or weight on file to calculate BMI.    Physical Exam  Vitals signs and nursing note reviewed.   Constitutional:       General: She is not in acute distress.     Appearance: She is ill-appearing.   HENT:      Head: Normocephalic and atraumatic.   Eyes:      General: No scleral icterus.     Conjunctiva/sclera: Conjunctivae normal.   Neck:      Musculoskeletal: Normal range of motion.      Vascular: No JVD.   Cardiovascular:      Rate and Rhythm: Normal rate and regular rhythm.   Pulmonary:      Effort: Pulmonary effort is normal.      Breath sounds: Decreased air movement present.   Abdominal:      General: Bowel sounds are normal. There is no distension.      Palpations: Abdomen is soft.      Tenderness: There is no abdominal tenderness.   Musculoskeletal: Normal range of motion.   Skin:     General: Skin is warm and dry.   Neurological:      Mental Status: She is disoriented.   Psychiatric:         Attention and Perception: She is inattentive.         Behavior: Behavior is cooperative.         Cognition and Memory: Cognition is impaired. Memory is impaired.         Results Review:  I reviewed the patient's new clinical results.  I reviewed the patient's new imaging results and agree with the  interpretation.  I reviewed the patient's other test results and agree with the interpretation  I personally viewed and interpreted the patient's EKG/Telemetry data  Discussed with ED provider.    Lab Results (last 24 hours)     Procedure Component Value Units Date/Time    CBC & Differential [[id number]]  (Abnormal) Collected: 12/08/20 1934    Specimen: Blood Updated: 12/08/20 1940    Narrative:      The following orders were created for panel order CBC & Differential.  Procedure                               Abnormality         Status                     ---------                               -----------         ------                     CBC Auto Differential[[id number]]        Abnormal            Final result                 Please view results for these tests on the individual orders.    Comprehensive Metabolic Panel [[id number]]  (Abnormal) Collected: 12/08/20 1934    Specimen: Blood Updated: 12/08/20 2011     Glucose 101 mg/dL      BUN 48 mg/dL      Creatinine 0.99 mg/dL      Sodium 158 mmol/L      Potassium 4.5 mmol/L      Chloride 119 mmol/L      CO2 28.3 mmol/L      Calcium 9.1 mg/dL      Total Protein 8.0 g/dL      Albumin 3.10 g/dL      ALT (SGPT) 75 U/L      AST (SGOT) 83 U/L      Alkaline Phosphatase 85 U/L      Total Bilirubin 0.5 mg/dL      eGFR Non African Amer 54 mL/min/1.73      Globulin 4.9 gm/dL      A/G Ratio 0.6 g/dL      BUN/Creatinine Ratio 48.5     Anion Gap 10.7 mmol/L     Narrative:      GFR Normal >60  Chronic Kidney Disease <60  Kidney Failure <15      Valproic Acid Level, Total [[id number]]  (Abnormal) Collected: 12/08/20 1934    Specimen: Blood Updated: 12/08/20 2013     Valproic Acid 19.0 mcg/mL     CBC Auto Differential [[id number]]  (Abnormal) Collected: 12/08/20 1934    Specimen: Blood Updated: 12/08/20 1940     WBC 10.69 10*3/mm3      RBC 5.70 10*6/mm3      Hemoglobin 16.2 g/dL      Hematocrit 49.6 %      MCV 87.0 fL      MCH 28.4 pg      MCHC 32.7 g/dL      RDW 12.9 %      RDW-SD  39.8 fl      MPV 10.4 fL      Platelets 242 10*3/mm3      Neutrophil % 69.1 %      Lymphocyte % 18.6 %      Monocyte % 11.1 %      Eosinophil % 0.1 %      Basophil % 0.3 %      Immature Grans % 0.8 %      Neutrophils, Absolute 7.38 10*3/mm3      Lymphocytes, Absolute 1.99 10*3/mm3      Monocytes, Absolute 1.19 10*3/mm3      Eosinophils, Absolute 0.01 10*3/mm3      Basophils, Absolute 0.03 10*3/mm3      Immature Grans, Absolute 0.09 10*3/mm3      nRBC 0.1 /100 WBC     Urinalysis With Culture If Indicated - Urine, Catheter In/Out [[id number]]  (Abnormal) Collected: 12/08/20 1943    Specimen: Urine, Catheter In/Out Updated: 12/08/20 2032     Color, UA Yellow     Appearance, UA Cloudy     pH, UA <=5.0     Specific Gravity, UA 1.018     Glucose, UA Negative     Ketones, UA Negative     Bilirubin, UA Negative     Blood, UA Trace     Protein, UA Negative     Leuk Esterase, UA Moderate (2+)     Nitrite, UA Negative     Urobilinogen, UA 0.2 E.U./dL    Urinalysis, Microscopic Only - Urine, Catheter In/Out [[id number]]  (Abnormal) Collected: 12/08/20 1943    Specimen: Urine, Catheter In/Out Updated: 12/08/20 2032     RBC, UA 0-2 /HPF      WBC, UA Too Numerous to Count /HPF      Bacteria, UA 4+ /HPF      Squamous Epithelial Cells, UA 0-2 /HPF      Hyaline Casts, UA 0-2 /LPF      Methodology Automated Microscopy    Urine Culture - Urine, Urine, Catheter In/Out [[id number]] Collected: 12/08/20 1943    Specimen: Urine, Catheter In/Out Updated: 12/08/20 2032    COVID PRE-OP / PRE-PROCEDURE SCREENING ORDER (NO ISOLATION) - Swab, Nasopharynx [[id number]]  (Abnormal) Collected: 12/08/20 2022    Specimen: Swab from Nasopharynx Updated: 12/08/20 2131    Narrative:      The following orders were created for panel order COVID PRE-OP / PRE-PROCEDURE SCREENING ORDER (NO ISOLATION) - Swab, Nasopharynx.  Procedure                               Abnormality         Status                     ---------                               -----------          ------                     Respiratory Panel PCR w/...[[id number]]  Abnormal            Final result                 Please view results for these tests on the individual orders.    Respiratory Panel PCR w/COVID-19(SARS-CoV-2) [facility] In-House, NP Swab in UTM/VTM, 3-4 HR TAT - Swab, Nasopharynx [[id number]]  (Abnormal) Collected: 12/08/20 2022    Specimen: Swab from Nasopharynx Updated: 12/08/20 2131     ADENOVIRUS, PCR Not Detected     Coronavirus 229E Not Detected     Coronavirus HKU1 Not Detected     Coronavirus NL63 Not Detected     Coronavirus OC43 Not Detected     COVID19 Detected     Human Metapneumovirus Not Detected     Human Rhinovirus/Enterovirus Not Detected     Influenza A PCR Not Detected     Influenza B PCR Not Detected     Parainfluenza Virus 1 Not Detected     Parainfluenza Virus 2 Not Detected     Parainfluenza Virus 3 Not Detected     Parainfluenza Virus 4 Not Detected     RSV, PCR Not Detected     Bordetella pertussis pcr Not Detected     Bordetella parapertussis PCR Not Detected     Chlamydophila pneumoniae PCR Not Detected     Mycoplasma pneumo by PCR Not Detected    Narrative:      Fact sheet for providers: https://docs.[employer]/wp-content/uploads/PSX1062-1529-LQ6.1-EUA-Provider-Fact-Sheet-3.pdf    Fact sheet for patients: https://docs.[employer]/wp-content/uploads/CKE2498-4329-MO4.1-EUA-Patient-Fact-Sheet-1.pdf    Test performed by PCR.          Imaging Results (Last 24 Hours)     Procedure Component Value Units Date/Time    XR Chest 1 View [[id number]] Collected: 12/08/20 2045     Updated: 12/08/20 2059    Narrative:      ONE VIEW PORTABLE CHEST     HISTORY: Fever and confusion.     FINDINGS: There is prominent elevation of the left hemidiaphragm  unchanged from 02/26/2020. The lungs are clear. There is mild  cardiomegaly that is unchanged and no acute abnormality is seen.     This report was finalized on 12/8/2020 8:56 PM by Dr. William Rogers M.D.                 No orders to display        Assessment/Plan     Active Hospital Problems    Diagnosis  POA   • **COVID-19 virus infection [U07.1]  Yes   • Acute cystitis without hematuria [N30.00]  Yes   • Metabolic encephalopathy [G93.41]  Yes   • Dehydration [E86.0]  Yes   • Hypernatremia [E87.0]  Yes   • Dementia without behavioral disturbance (CMS/HCC) [F03.90]  Yes      Resolved Hospital Problems   No resolved problems to display.       Current oxygen requirement:  SpO2:  [91 %-99 %] 99 % on Flow (L/min):  [2] 2;   Device (Oxygen Therapy): nasal cannula   95% on RA      80 y.o. female admitted with moderate COVID-19 virus infection.  Onset of symptoms was 2 days ago.  She is currently maintaining oxygen saturation around 95% on RA.  She drops slightly when falls asleep.      Plan to admit to telemetry unit for close monitoring and treatment.    She has the following risk factors for worse outcomes due to COVID 19 infection:   Age > 65  Will trend inflammatory markers.  Given current oxygenation will hold off on dexamethasone and remdesivir.    Consider starting if saturations worsen.      Difficult to say if mental status is worse than usual but given report from NH she likely has metabolic encephalopathy due to UTI, dehydration, hyperNa+ and COVID infection.  Will monitor MS with treatment.      Continue hypotonic IVFs.  Consider D5W tomorrow if repeat Na not much improved.  Rocephin started for UTI.     Pharmacy to dose Lovenox for DVT prophylaxis.  Full code.  Discussed with patient and ED provider.

## 2020-12-09 NOTE — PLAN OF CARE
Goal Outcome Evaluation:  Plan of Care Reviewed With: patient  Progress: no change  Outcome Summary:   Bedside swallow evaluation completed. Pt demonstrates moderate oral dysphagia, with bite reflex and mechanical soft peaches manually removed from oral cavity. Overt s/s aspiration were noted with mixed consistency/mechanical soft solids. No further overt s/s aspiration with thins or puree.     Recommend: initiation of puree and thin liquid diet. Medications crushed in puree. Compensations to include: full feed, upright for all PO, small bites/sips, check mouth for oral pocketing (not noted with puree but does have oral difficulties with soft solids).     SLP to follow up as appropriate for re-evaluation or tolerance as indicated.    PPE: Patient was not wearing a face mask during this therapy encounter secondary to swallow evaluation performed. Therapist used appropriate personal protective equipment including gown, eye protection, mask and gloves.  Mask used was N95/duckbill. Appropriate PPE was worn during the entire therapy session. The patient coughed during this evaluation. Therapist was within 6 feet for 15 minutes or more during the evaluation. Hand hygiene was completed before and after therapy session. Patient is in enhanced droplet precautions.

## 2020-12-09 NOTE — CONSULTS
"Adult Nutrition  Assessment/PES    Patient Name:  Ena Lima  YOB: 1940  MRN: [id number]  Admit Date:  12/8/2020    Assessment Date:  12/9/2020  Nutrition assessment triggered by MST score-3, low BMI-18.4, skin report-multiple pressure injuries and RN consult.  EMR reviewed. Admitted with +COVID-19, Metabolic encephalopathy, dehydration, hypernatremia, dementia.  Nonverbal. Speech therapy evaluated-recommend pureed+thins. Ordered nutritional supplement-boost TID.  Will follow clinical course, po intake and nutritional needs.   Reason for Assessment     Row Name 12/09/20 1152          Reason for Assessment    Reason For Assessment  identified at risk by screening criteria;nurse/nurse practitioner consult     Diagnosis  +COVID-19, Metabolic encephalopathy, dehydration, hypernatremia, dementia     Identified At Risk by Screening Criteria  MST SCORE 2+;BMI;large or nonhealing wound, burn or pressure injury           Anthropometrics     Row Name 12/09/20 1151          Anthropometrics    Height  170.2 cm (67.01\")        Admit Weight    Admit Weight  53.5 kg (117 lb 15.1 oz)        Ideal Body Weight (IBW)    Ideal Body Weight (IBW) (kg)  61.88     % of Ideal Body Weight Assessment  -- 86%        Body Mass Index (BMI)    BMI Assessment  BMI 17-18.4: protein-energy malnutrition grade I BMI-18.4         Labs/Tests/Procedures/Meds     Row Name 12/09/20 1156          Labs/Procedures/Meds    Lab Results Reviewed  reviewed, pertinent     Lab Results Comments  Glu, Na, ALT, AST        Diagnostic Tests/Procedures    Diagnostic Test/Procedure Reviewed  reviewed, pertinent        Medications    Pertinent Medications Reviewed  reviewed, pertinent     Pertinent Medications Comments  folic acid, remeron, vitamin B12 ,D5% with NaCl, KCl         Physical Findings     Row Name 12/09/20 1152          Physical Findings    Overall Physical Appearance  underweight B=12     Skin  poor skin integrity/turgor;pressure injury     " "    Estimated/Assessed Needs     Row Name 12/09/20 1158 12/09/20 1157       Calculation Measurements    Weight Used For Calculations  53.5 kg (117 lb 15.1 oz)      Height    170.2 cm (67.01\")       Estimated/Assessed Needs    Additional Documentation  KCAL/KG (Group);Protein Requirements (Group);Fluid Requirements (Group)         KCAL/KG    KCAL/KG  25 Kcal/Kg (kcal);30 Kcal/Kg (kcal)      25 Kcal/Kg (kcal)  1337.5      30 Kcal/Kg (kcal)  1605         Protein Requirements    Weight Used For Protein Calculations  53.5 kg (117 lb 15.1 oz)      Est Protein Requirement Amount (gms/kg)  1.2 gm protein      Estimated Protein Requirements (gms/day)  64.2         Fluid Requirements    Fluid Requirements (mL/day)  1600      RDA Method (mL)  1600          Nutrition Prescription Ordered     Row Name 12/09/20 1159          Nutrition Prescription PO    Current PO Diet  Pureed     Fluid Consistency  Thin                 Problem/Interventions:  Problem 1     Row Name 12/09/20 1159          Nutrition Diagnoses Problem 1    Problem 1  Underweight     Etiology (related to)  MNT for Treatment/Condition     Signs/Symptoms (evidenced by)  BMI     BMI  18 - 18.9               Intervention Goal     Row Name 12/09/20 1159          Intervention Goal    General  Maintain nutrition;Reduce/improve symptoms;Meet nutritional needs for age/condition     PO  Tolerate PO     Weight  Maintain weight         Nutrition Intervention     Row Name 12/09/20 1159          Nutrition Intervention    RD/Tech Action  Care plan reviewd;Follow Tx progress;Recommend/ordered     Recommended/Ordered  Supplement         Nutrition Prescription     Row Name 12/09/20 1159          Nutrition Prescription PO    PO Prescription  Begin/change supplement     Supplement  Boost     Supplement Frequency  3 times a day     New PO Prescription Ordered?  Yes         Education/Evaluation     Row Name 12/09/20 1152          Education    Education  Will Instruct as appropriate     "    Monitor/Evaluation    Monitor  Per protocol           Electronically signed by:  Estela Patrick RD  12/09/20 12:00 EST

## 2020-12-09 NOTE — CONSULTS
Purpose of the visit was to evaluate for: goals of care/advanced care planning and support for patient/family. Spoke with RN as well as daughter Antonia and discussed palliative care, goals of care, care options and resuscitation status.      Assessment:  Patient is palliative care appropriate due to dementia, dysphagia, immobility. Alert but non-verbal. PPS 40%.     Recommendations/Plan: Continue current plan of care for now.    Other Comments: Palliative Care spoke with her daughter Antonia by phone. States she has noticed since her mom went into the facility she has almost quit walking, but used to be pretty mobile. She is very hard of hearing. She has read about patients with dementia having trouble with swallowing over time. States she may or may not participate in therapy.     We discussed briefly resuscitation and CPR. She wants to continue those orders for now.     I will follow up with her in a few days.

## 2020-12-09 NOTE — PAYOR COMM NOTE
"Ena Lima (80 y.o. Female)     PLEASE SEE ATTACHED CLINICAL REVIEW.     PT. WAS ADMITTED TO [facility] ON 12/8/20 -    PLEASE CALL  OR  [phone] WITH INPT AUTH AND DAYS APPROVED    THANK YOU    JENNIFER TREVIZO LPN [facility]        Date of Birth Social Security Number Address Home Phone MRN    1940  [id number] [id number] [phone] [id number]    Bahai Marital Status          Quaker        Admission Date Admission Type Admitting Provider Attending Provider Department, Room/Bed    12/8/20 Emergency Mack Baldwin MD Broaddus, Emmett J., MD [address], [address]    Discharge Date Discharge Disposition Discharge Destination                       Attending Provider: Betito Middleton MD    Allergies: Adhesive Tape, Sulfa Antibiotics    Isolation: Enh Drop/Con   Infection: COVID (confirmed) (12/08/20)   Code Status: CPR    Ht: 170.2 cm (67\")   Wt: 53.5 kg (118 lb)    Admission Cmt: None   Principal Problem: COVID-19 virus infection [U07.1]                 Active Insurance as of 12/8/2020     Primary Coverage     Payor Plan Insurance Group Employer/Plan Group    [employer] [place] MEDICARE REPLACEMENT [facility] MEDICARE REPLACEMENT      Payor Plan Address Payor Plan Phone Number Payor Plan Fax Number Effective Dates    [address] [address] [phone]  2/26/2020 - None Entered    [place] [address]       Subscriber Name Subscriber Birth Date Member ID       Ena Lima 1940 [id number]                 Emergency Contacts      (Rel.) Home Phone Work Phone Mobile Phone    RICARDO RAMIREZ (Guardian) [phone] -- --               History & Physical      Mack Baldwin MD at 12/08/20 6576              Patient Name:  Ena Lima  YOB: 1940  MRN:  [id number]  Admit Date:  12/8/2020  Patient Care Team:  Tania Lam APRN as PCP - General (Family Medicine)      Subjective   History Present Illness     Chief " Complaint   Patient presents with   • Altered Mental Status       Ms. Lima is a 80 y.o. female with a history of dementia from a NH that presents to [facility] complaining of worsening confusion.  Unclear exactly what her baseline mental status is like but at time of my examination she is essentially nonverbal and does not answer my questions.  She does not follow simple commands either.    History of Present Illness  Review of Systems   Unable to perform ROS: Dementia        Personal History     Past Medical History:   Diagnosis Date   • Arthritis    • Dementia (CMS/HCC)      Past Surgical History:   Procedure Laterality Date   • HERNIA REPAIR       No family history on file.  Social History     Tobacco Use   • Smoking status: Unknown If Ever Smoked   Substance Use Topics   • Alcohol use: Defer   • Drug use: Defer     No current facility-administered medications on file prior to encounter.      Current Outpatient Medications on File Prior to Encounter   Medication Sig Dispense Refill   • celecoxib (CeleBREX) 200 MG capsule Take 200 mg by mouth Daily.     • diphenhydrAMINE (BENADRYL ALLERGY) 25 MG tablet Take 25 mg by mouth Every 6 (Six) Hours As Needed for Itching.     • divalproex (DEPAKOTE) 125 MG DR tablet Take 125 mg by mouth Every Evening.     • divalproex (DEPAKOTE) 250 MG DR tablet Take 250 mg by mouth every night at bedtime.     • furosemide (LASIX) 20 MG tablet Take 20 mg by mouth Daily.     • mineral oil-hydrophilic petrolatum (AQUAPHOR) ointment Apply  topically to the appropriate area as directed As Needed for Dry Skin. To affected areas, bilateral legs before wrapping with Kerlix     • OLANZapine (zyPREXA) 10 MG tablet Take 10 mg by mouth every night at bedtime.     • OLANZapine (zyPREXA) 5 MG tablet Take 5 mg by mouth Every Night.     • potassium chloride (MICRO-K) 10 MEQ CR capsule Take 10 mEq by mouth Daily.     • traMADol (ULTRAM) 50 MG tablet Take 50 mg by mouth 3 (Three) Times a  Day As Needed for Moderate Pain .     • traZODone (DESYREL) 100 MG tablet Take 100 mg by mouth Every Night.       Allergies   Allergen Reactions   • Adhesive Tape Hives   • Sulfa Antibiotics Unknown - Low Severity       Objective    Objective     Vital Signs  Temp:  [98.2 °F (36.8 °C)] 98.2 °F (36.8 °C)  Heart Rate:  [77-87] 87  Resp:  [16-18] 16  BP: ()/(67-77) 94/67  SpO2:  [91 %-99 %] 99 %  on  Flow (L/min):  [2] 2;   Device (Oxygen Therapy): nasal cannula  There is no height or weight on file to calculate BMI.    Physical Exam  Vitals signs and nursing note reviewed.   Constitutional:       General: She is not in acute distress.     Appearance: She is ill-appearing.   HENT:      Head: Normocephalic and atraumatic.   Eyes:      General: No scleral icterus.     Conjunctiva/sclera: Conjunctivae normal.   Neck:      Musculoskeletal: Normal range of motion.      Vascular: No JVD.   Cardiovascular:      Rate and Rhythm: Normal rate and regular rhythm.   Pulmonary:      Effort: Pulmonary effort is normal.      Breath sounds: Decreased air movement present.   Abdominal:      General: Bowel sounds are normal. There is no distension.      Palpations: Abdomen is soft.      Tenderness: There is no abdominal tenderness.   Musculoskeletal: Normal range of motion.   Skin:     General: Skin is warm and dry.   Neurological:      Mental Status: She is disoriented.   Psychiatric:         Attention and Perception: She is inattentive.         Behavior: Behavior is cooperative.         Cognition and Memory: Cognition is impaired. Memory is impaired.         Results Review:  I reviewed the patient's new clinical results.  I reviewed the patient's new imaging results and agree with the interpretation.  I reviewed the patient's other test results and agree with the interpretation  I personally viewed and interpreted the patient's EKG/Telemetry data  Discussed with ED provider.    Imaging Results (Last 24 Hours)     Procedure  Component Value Units Date/Time    XR Chest 1 View [[id number]] Collected: 12/08/20 2045     Updated: 12/08/20 2059    Narrative:      ONE VIEW PORTABLE CHEST     HISTORY: Fever and confusion.     FINDINGS: There is prominent elevation of the left hemidiaphragm  unchanged from 02/26/2020. The lungs are clear. There is mild  cardiomegaly that is unchanged and no acute abnormality is seen.     This report was finalized on 12/8/2020 8:56 PM by Dr. William Rogers M.D.                No orders to display        Assessment/Plan     Active Hospital Problems    Diagnosis  POA   • **COVID-19 virus infection [U07.1]  Yes   • Acute cystitis without hematuria [N30.00]  Yes   • Metabolic encephalopathy [G93.41]  Yes   • Dehydration [E86.0]  Yes   • Hypernatremia [E87.0]  Yes   • Dementia without behavioral disturbance (CMS/HCC) [F03.90]  Yes      Resolved Hospital Problems   No resolved problems to display.       Current oxygen requirement:  SpO2:  [91 %-99 %] 99 % on Flow (L/min):  [2] 2;   Device (Oxygen Therapy): nasal cannula   95% on RA      80 y.o. female admitted with moderate COVID-19 virus infection.  Onset of symptoms was 2 days ago.  She is currently maintaining oxygen saturation around 95% on RA.  She drops slightly when falls asleep.      Plan to admit to telemetry unit for close monitoring and treatment.    She has the following risk factors for worse outcomes due to COVID 19 infection:   • Age > 65  Will trend inflammatory markers.  Given current oxygenation will hold off on dexamethasone and remdesivir.    Consider starting if saturations worsen.      Difficult to say if mental status is worse than usual but given report from NH she likely has metabolic encephalopathy due to UTI, dehydration, hyperNa+ and COVID infection.  Will monitor MS with treatment.      Continue hypotonic IVFs.  Consider D5W tomorrow if repeat Na not much improved.  Rocephin started for UTI.     Pharmacy to dose Lovenox for DVT  "prophylaxis.  Full code.  Discussed with patient and ED provider.        Electronically signed by Mack Baldwin MD at 12/08/20 2309          Emergency Department Notes      Kareem Melendez, RN at 12/08/20 1859        Patient to er per ems from nursing home with c/o increasing in confusion and mental status change over the last two days with fever. Reported base line of dementia per report. Patient has mask on in triage along with staff.      Kareem Melendez RN  12/08/20 1900      Electronically signed by Kareem Melendez RN at 12/08/20 1900     Stephen Case, RN at 12/08/20 1905        Spoke with family regarding patient status and was provided patient access code.  Informed family of enhanced visitor policy and they verbalized understanding.  Daughter can be reached at number in emergency contact.               Stephen Case RN  12/08/20 1907      Electronically signed by Stephen Case RN at 12/08/20 1907     Abbi Downey RN at 12/08/20 1911        Spoke with Roxanne at [facility] via phone at [phone]. Roxanne reports pt has had a lack of appetite and a constant fever over 100.4F despite giving Tylenol for 2 days. Reports \"she is just not herself\". Requesting that we check for UTI. Pt hx of dementia and is normally oriented to herself only.      Abbi Downey RN  12/08/20 1915      Electronically signed by Abbi Downey RN at 12/08/20 1915     Isiah Mendes MD at 12/08/20 1917           EMERGENCY DEPARTMENT ENCOUNTER    Room Number:  42/42  Date of encounter:  12/8/2020  PCP: Tania Lam APRN  Historian: Patient     I used full protective equipment while examining this patient.  This includes face mask, gloves and protective eyewear.  I washed my hands before entering the room and immediately upon leaving the room      HPI:  Chief Complaint: Altered mental status  A complete HPI/ROS/PMH/PSH/SH/FH are unobtainable due to: Chronic " dementia    Context: Ena Lima is a 80 y.o. female who presents to the ED c/o altered mental status.  Patient sent from nursing home for not acting herself.  Patient has had decreased appetite not acting normal for her.  Patient does have chronic dementia and has had a nursing facility.  I am unable to get any history from patient as she does not answer questions nor follow commands.      PAST MEDICAL HISTORY  Active Ambulatory Problems     Diagnosis Date Noted   • No Active Ambulatory Problems     Resolved Ambulatory Problems     Diagnosis Date Noted   • No Resolved Ambulatory Problems     Past Medical History:   Diagnosis Date   • Arthritis    • Dementia (CMS/HCC)          PAST SURGICAL HISTORY  Past Surgical History:   Procedure Laterality Date   • HERNIA REPAIR           FAMILY HISTORY  No family history on file.      SOCIAL HISTORY  Social History     Socioeconomic History   • Marital status:      Spouse name: Not on file   • Number of children: Not on file   • Years of education: Not on file   • Highest education level: Not on file   Tobacco Use   • Smoking status: Unknown If Ever Smoked   Substance and Sexual Activity   • Alcohol use: Defer   • Drug use: Defer   • Sexual activity: Defer         ALLERGIES  Adhesive tape and Sulfa antibiotics       REVIEW OF SYSTEMS  Review of Systems   Unable to perform ROS: Dementia           PHYSICAL EXAM    I have reviewed the triage vital signs and nursing notes.    ED Triage Vitals [12/08/20 1901]   Temp Heart Rate Resp BP SpO2   98.2 °F (36.8 °C) 87 18 105/69 95 %      Temp src Heart Rate Source Patient Position BP Location FiO2 (%)   Tympanic -- -- -- --       Physical Exam  GENERAL: Elderly female in no obvious distress, she will not answer questions nor follow commands  HENT: nares patent atraumatic  EYES: no scleral icterus  CV: regular rhythm, regular rate-no murmur  RESPIRATORY: normal effort, clear to auscultation bilaterally-saturations 96% on room  air  ABDOMEN: soft, nontender to palpation  MUSCULOSKELETAL: no deformity  NEURO: Strength-mild generalized weakness without focal deficit.  Sensation appears to be grossly intact and she does withdraw from painful stimuli.  Coordination appears to be grossly intact but limited by patient's dementia.  Mental status-patient moves all extremities but will not really answer questions or follow commands.  She is essentially nonverbal.  SKIN: warm, dry-no worrisome rashes are noted      RADIOLOGY  Xr Chest 1 View    Result Date: 12/8/2020  ONE VIEW PORTABLE CHEST  HISTORY: Fever and confusion.  FINDINGS: There is prominent elevation of the left hemidiaphragm unchanged from 02/26/2020. The lungs are clear. There is mild cardiomegaly that is unchanged and no acute abnormality is seen.  This report was finalized on 12/8/2020 8:56 PM by Dr. William Rogers M.D.        I ordered the above noted radiological studies. Reviewed by me and discussed with radiologist.  See dictation for official radiology interpretation.      PROCEDURES  Procedures      MEDICATIONS GIVEN IN ER    Medications   sodium chloride 0.9 % flush 10 mL (has no administration in time range)   dextrose 5 % and sodium chloride 0.45 % with KCl 10 mEq/L infusion (has no administration in time range)   cefTRIAXone (ROCEPHIN) IVPB 1 g (has no administration in time range)         PROGRESS, DATA ANALYSIS, CONSULTS, AND MEDICAL DECISION MAKING    All labs have been independently reviewed by me.  All radiology studies have been reviewed by me and discussed with radiologist dictating the report.   EKG's independently viewed and interpreted by me.  Discussion below represents my analysis of pertinent findings related to patient's condition, differential diagnosis, treatment plan and final disposition.      ED Course as of Dec 08 2140   Tue Dec 08, 2020   1918 DM-reviewed prior medical records and note the patient was seen in our ER and discharged with a diagnosis of  cystitis in February of this year.    [DB]   1929 [id number]-old female with history of dementia with behavioral disturbance presents with likely encephalopathy.  Patient is not acting herself and seems to be possibly more confused than usual.  There is nursing report of a fever greater than 100.4 from the nursing home.  We will check for signs of infection to include UTI, upper respiratory infection and Covid.    [DB]   2048 Patient pretty hypernatremic with sodium of 158.  Also urinalysis is suggestive of UTI with 4+ bacteria.  We will go ahead and admit to the hospital for UTI and hypernatremia in patient with dementia.    [DB]   2138 Chest x-ray as discussed with the radiologist is unremarkable.    Covid testing did come back positive for COVID-19.    I discussed results of testing and need for hospitalization with  from [facility] who will admit on behalf of Dr. Mack Baldwin.    [DB]      ED Course User Index  [DB] Isiah Mendes MD       AS OF 21:40 EST VITALS:    BP - 110/73  HR - 78  TEMP - 98.2 °F (36.8 °C) (Tympanic)  O2 SATS - 94%      DIAGNOSIS  Final diagnoses:   Hypernatremia   Urinary tract infection without hematuria, site unspecified   Dementia with behavioral disturbance, unspecified dementia type (CMS/HCC)   COVID-19 virus infection         DISPOSITION  Admission         Isiah Mendes MD  12/08/20 2140      Electronically signed by Isiah Mendes MD at 12/08/20 2140     Abbi Downey RN at 12/08/20 2021        Pts O2 sat dropped to 88% on RA while sleeping. Pt placed on 2L NC.      Abbi Downey, [place]  12/08/20 2021      Electronically signed by Abbi Downey, RN at 12/08/20 2021     Abbi Downey, RN at 12/08/20 2110        2nd RN at bedside to look for IV placement.      Abbi Downey, RN  12/08/20 2134      Electronically signed by Abbi Downey, RN at 12/08/20 2134     Abbi Downey, [place] at 12/08/20  2131        Pt is now in enhanced droplet precautions. This RN was wearing mask and goggles in room initially but will now wear full PPE.      Abbi Downey RN  12/08/20 2133      Electronically signed by Abbi Downey RN at 12/08/20 2133     Abbi Downey RN at 12/08/20 2134        IV team paged.      Abbi Downey RN  12/08/20 2134      Electronically signed by Abbi Downey RN at 12/08/20 2134     Amairani Billings RN at 12/08/20 2136        Attempted iv access w/out success. I wore full protective equipment throughout this patient encounter including a face mask, eye shield and gloves. Hand hygiene/washing of hands was performed before donning protective equipment and after removal when leaving the room.       Amairani Billings RN  12/08/20 2136      Electronically signed by Amairani Billings RN at 12/08/20 2136     Abbi Downey RN at 12/08/20 2205        Attempted to call report to floor. Nurse busy and will call back.      Abbi Downey RN  12/08/20 2211      Electronically signed by Abbi Downey RN at 12/08/20 2211     Abbi Downey RN at 12/08/20 2208        Spoke with Antonia (daughter) via phone at [phone] for update on pt status and plan.      Abbi Downey RN  12/08/20 2210      Electronically signed by Abbi Downey RN at 12/08/20 2210     Abbi Downey RN at 12/08/20 2229        IV team is going to see pt on the floor. Trinh RN notified and will administer medications and fluids due.      Abbi Downey RN  12/08/20 2230      Electronically signed by Abbi Downey RN at 12/08/20 2230       {Outbreak/Travel/Exposure Documentation......;  Question Available Choices Patient Response   Outbreak Screen: Do you currently have a new onset of the following symptoms?        Fever/Chils, Cough, Shortness of air, Loss of taste  or smell, No, Unknown  (!) Fever/Chills (12/08/20 1900)   Outbreak Screen: In the last 14 days, have you had contact with anyone who is ill, has show any of the symptoms listed above and/or has been diagnosis with the 2019 Novel Coronavirus? This includes any immediate household members but excludes any patients with whom you have been in contact within your normal work duties wearing proper PPE, if you are a healthcare worker.  Yes, No, Unknown              Unknown (12/08/20 1900)   Outbreak Screen: Who was notified?    Free text  (not recorded)   Travel Screen: Have you traveled in the last month? If so, to what country have you traveled? If [place] what state? Yes, No, Unknown  List of all countries  List of all States No (12/08/20 1900)  (not recorded)  (not recorded)   Infection Risk: Do you currently have the following symptoms?  (If cough is selected, the Tuberculosis Screen is performed.) Cough, Fever, Rash, No No (12/08/20 1900)   Tuberculosis Screen: Do you have any of the following Tuberculosis Risks?  · Have you lived or spent time with anyone who had or may have TB?  · Have you lived in or visited any of the following areas for more than one month: No, [place], [place], [place] or [place], the [place] or [place]?  · Do you have HIV/AIDS?  · Have you lived in or worked in a nursing home, homeless shelter, correctional facility, or substance abuse treatment facility?   · No    If Yes do you have any of the following symptoms? Yes responses display to the right    If Yes, symptoms listed are:  Cough greater than or equal to 3 weeks, Loss of appetite, Unexplained weight loss, Night sweats, Bloody sputum or hemoptysis, Hoarseness, Fever, Fatigue, Chest pain, No (not recorded)  (not recorded)   Exposure Screen: Have you been exposed to any of these contagious diseases in the last month? Measles, Chickenpox, Meningitis, Pertussis, Whooping Cough, No No (12/08/20 1900)     Oxygen Therapy (since  admission)     Date/Time   SpO2   Device (Oxygen Therapy)   Flow (L/min)   Oxygen Concentration (%)   ETCO2 (mmHg)    12/09/20 0839   --   room air   2   --   --    12/09/20 0720   97   room air   --   --   --    12/09/20 0129   --   room air   --   --   --    12/08/20 2316   95   nasal cannula   2   --   --    12/08/20 2221   99   --   --   --   --    12/08/20 2200   91   --   --   --   --    12/08/20 2130   94   --   --   --   --    12/08/20 20:25:40   94   nasal cannula   2   --   --    12/08/20 1945   96   --   --   --   --    12/08/20 19:18:40   94   room air   --   --   --    12/08/20 1901   95   --   --   --   --            Intake & Output (last 2 days)       12/07 0701 - 12/08 0700 12/08 0701 - 12/09 0700 12/09 0701 - 12/10 0700    P.O.   0    I.V. (mL/kg)  900 (16.8)     IV Piggyback  50     Total Intake(mL/kg)  950 (17.8) 0 (0)    Net  +950 0           Urine Unmeasured Occurrence  1 x 1 x        Lines, Drains & Airways    Active LDAs     Name:   Placement date:   Placement time:   Site:   Days:    Peripheral IV 12/08/20 2354 Anterior;Right Hand   12/08/20 2354    Hand   less than 1    External Urinary Catheter   12/08/20 2316    --   less than 1         Inactive LDAs     None                  Lab Results (all)     Procedure Component Value Units Date/Time    Urine Culture - Urine, Urine, Catheter In/Out [[id number]]  (Abnormal) Collected: 12/08/20 1943    Specimen: Urine, Catheter In/Out Updated: 12/09/20 1109     Urine Culture >100,000 CFU/mL Escherichia coli    Comprehensive Metabolic Panel [[id number]]  (Abnormal) Collected: 12/09/20 0610    Specimen: Blood Updated: 12/09/20 0737     Glucose 130 mg/dL      BUN 53 mg/dL      Creatinine 0.84 mg/dL      Sodium 158 mmol/L      Potassium 3.7 mmol/L      Chloride 122 mmol/L      CO2 26.4 mmol/L      Calcium 8.7 mg/dL      Total Protein 7.1 g/dL      Albumin 2.80 g/dL      ALT (SGPT) 59 U/L      AST (SGOT) 61 U/L      Alkaline Phosphatase 72 U/L      Total  Bilirubin 0.3 mg/dL      eGFR Non African Amer 65 mL/min/1.73      Globulin 4.3 gm/dL      A/G Ratio 0.7 g/dL      BUN/Creatinine Ratio 63.1     Anion Gap 9.6 mmol/L     Narrative:      Troponin [[id number]]  (Abnormal) Collected: 12/09/20 0610    Specimen: Blood Updated: 12/09/20 0726     Troponin T 0.080 ng/mL     Narrative:      C-reactive Protein [[id number]]  (Abnormal) Collected: 12/09/20 0610    Specimen: Blood Updated: 12/09/20 0720     C-Reactive Protein 17.79 mg/dL     CK [[id number]]  (Abnormal) Collected: 12/09/20 0610    Specimen: Blood Updated: 12/09/20 0719     Creatine Kinase 296 U/L     D-dimer, Quantitative [[id number]]  (Abnormal) Collected: 12/09/20 0610    Specimen: Blood Updated: 12/09/20 0701     D-Dimer, Quantitative 1.28 MCGFEU/mL     Narrative:      Fibrinogen [[id number]]  (Abnormal) Collected: 12/09/20 0610    Specimen: Blood Updated: 12/09/20 0701     Fibrinogen 1,013 mg/dL     Ferritin [[id number]]  (Abnormal) Collected: 12/09/20 0610    Specimen: Blood Updated: 12/09/20 0701     Ferritin 1,305.00 ng/mL     Narrative:      Results may be falsely decreased if patient taking Biotin.      Lactate Dehydrogenase [[id number]]  (Abnormal) Collected: 12/09/20 0610    Specimen: Blood Updated: 12/09/20 0700      U/L     CBC Auto Differential [[id number]]  (Abnormal) Collected: 12/09/20 0610    Specimen: Blood Updated: 12/09/20 0629     WBC 10.23 10*3/mm3      RBC 4.67 10*6/mm3      Hemoglobin 13.2 g/dL      Hematocrit 41.5 %      MCV 88.9 fL      MCH 28.3 pg      MCHC 31.8 g/dL      RDW 13.2 %      RDW-SD 43.0 fl      MPV 10.8 fL      Platelets 254 10*3/mm3      Neutrophil % 73.6 %      Lymphocyte % 15.8 %      Monocyte % 9.0 %      Eosinophil % 0.5 %      Basophil % 0.2 %      Immature Grans % 0.9 %      Neutrophils, Absolute 7.53 10*3/mm3      Lymphocytes, Absolute 1.62 10*3/mm3      Monocytes, Absolute 0.92 10*3/mm3      Eosinophils, Absolute 0.05 10*3/mm3      Basophils, Absolute 0.02 10*3/mm3       Immature Grans, Absolute 0.09 10*3/mm3      nRBC 0.0 /100 WBC     Lactic Acid, Plasma [[id number]]  (Normal) Collected: 12/09/20 0610    Specimen: Blood Updated: 12/09/20 0629     Lactate 1.3 mmol/L     COVID PRE-OP / PRE-PROCEDURE SCREENING ORDER (NO ISOLATION) - Swab, Nasopharynx [[id number]]  (Abnormal) Collected: 12/08/20 2022    Specimen: Swab from Nasopharynx Updated: 12/08/20 2131    Narrative:      Respiratory Panel PCR w/COVID-19(SARS-CoV-2) [facility] In-House, NP Swab in UTM/VTM, 3-4 HR TAT - Swab, Nasopharynx [[id number]]  (Abnormal) Collected: 12/08/20 2022    Specimen: Swab from Nasopharynx Updated: 12/08/20 2131     ADENOVIRUS, PCR Not Detected     Coronavirus 229E Not Detected     Coronavirus HKU1 Not Detected     Coronavirus NL63 Not Detected     Coronavirus OC43 Not Detected     COVID19 Detected     Human Metapneumovirus Not Detected     Human Rhinovirus/Enterovirus Not Detected     Influenza A PCR Not Detected     Influenza B PCR Not Detected     Parainfluenza Virus 1 Not Detected     Parainfluenza Virus 2 Not Detected     Parainfluenza Virus 3 Not Detected     Parainfluenza Virus 4 Not Detected     RSV, PCR Not Detected     Bordetella pertussis pcr Not Detected     Bordetella parapertussis PCR Not Detected     Chlamydophila pneumoniae PCR Not Detected     Mycoplasma pneumo by PCR Not Detected    Narrative:      Urinalysis, Microscopic Only - Urine, Catheter In/Out [[id number]]  (Abnormal) Collected: 12/08/20 1943    Specimen: Urine, Catheter In/Out Updated: 12/08/20 2032     RBC, UA 0-2 /HPF      WBC, UA Too Numerous to Count /HPF      Bacteria, UA 4+ /HPF      Squamous Epithelial Cells, UA 0-2 /HPF      Hyaline Casts, UA 0-2 /LPF      Methodology Automated Microscopy    Urinalysis With Culture If Indicated - Urine, Catheter In/Out [[id number]]  (Abnormal) Collected: 12/08/20 1943    Specimen: Urine, Catheter In/Out Updated: 12/08/20 2032     Color, UA Yellow     Appearance, UA  Cloudy     pH, UA <=5.0     Specific Gravity, UA 1.018     Glucose, UA Negative     Ketones, UA Negative     Bilirubin, UA Negative     Blood, UA Trace     Protein, UA Negative     Leuk Esterase, UA Moderate (2+)     Nitrite, UA Negative     Urobilinogen, UA 0.2 E.U./dL    Valproic Acid Level, Total [[id number]]  (Abnormal) Collected: 12/08/20 1934    Specimen: Blood Updated: 12/08/20 2013     Valproic Acid 19.0 mcg/mL     Comprehensive Metabolic Panel [[id number]]  (Abnormal) Collected: 12/08/20 1934    Specimen: Blood Updated: 12/08/20 2011     Glucose 101 mg/dL      BUN 48 mg/dL      Creatinine 0.99 mg/dL      Sodium 158 mmol/L      Potassium 4.5 mmol/L      Chloride 119 mmol/L      CO2 28.3 mmol/L      Calcium 9.1 mg/dL      Total Protein 8.0 g/dL      Albumin 3.10 g/dL      ALT (SGPT) 75 U/L      AST (SGOT) 83 U/L      Alkaline Phosphatase 85 U/L      Total Bilirubin 0.5 mg/dL      eGFR Non African Amer 54 mL/min/1.73      Globulin 4.9 gm/dL      A/G Ratio 0.6 g/dL      BUN/Creatinine Ratio 48.5     Anion Gap 10.7 mmol/L     Narrative:      CBC & Differential [[id number]]  (Abnormal) Collected: 12/08/20 1934    Specimen: Blood Updated: 12/08/20 1940    Narrative:      CBC Auto Differential [[id number]]  (Abnormal) Collected: 12/08/20 1934    Specimen: Blood Updated: 12/08/20 1940     WBC 10.69 10*3/mm3      RBC 5.70 10*6/mm3      Hemoglobin 16.2 g/dL      Hematocrit 49.6 %      MCV 87.0 fL      MCH 28.4 pg      MCHC 32.7 g/dL      RDW 12.9 %      RDW-SD 39.8 fl      MPV 10.4 fL      Platelets 242 10*3/mm3      Neutrophil % 69.1 %      Lymphocyte % 18.6 %      Monocyte % 11.1 %      Eosinophil % 0.1 %      Basophil % 0.3 %      Immature Grans % 0.8 %      Neutrophils, Absolute 7.38 10*3/mm3      Lymphocytes, Absolute 1.99 10*3/mm3      Monocytes, Absolute 1.19 10*3/mm3      Eosinophils, Absolute 0.01 10*3/mm3      Basophils, Absolute 0.03 10*3/mm3      Immature Grans, Absolute 0.09 10*3/mm3      nRBC 0.1 /100 WBC            Imaging Results (Most Recent)     Procedure Component Value Units Date/Time    XR Chest 1 View [[id number]] Collected: 12/08/20 2045     Updated: 12/08/20 2059    Narrative:      ONE VIEW PORTABLE CHEST     HISTORY: Fever and confusion.     FINDINGS: There is prominent elevation of the left hemidiaphragm  unchanged from 02/26/2020. The lungs are clear. There is mild  cardiomegaly that is unchanged and no acute abnormality is seen.     This report was finalized on 12/8/2020 8:56 PM by Dr. William Rogers M.D.           All medication doses during the admission are shown, including meds that are no longer on order.   Scheduled Meds Sorted by Name  for Ena Lima as of 12/7/20 through 12/9/20    1 Day 3 Days 7 Days 10 Days <  Today >     Legend:                          Inactive     Active     Other Encounter     Linked                 Medications 12/07/20 12/08/20 12/09/20   cefTRIAXone (ROCEPHIN) IVPB 1 g   Dose: 1 g  Freq: Every 24 Hours Route: IV  Indications of Use: URINARY TRACT INFECTION  Start: 12/09/20 2000 End: 12/13/20 1959    Admin Instructions:   Caution: Look alike/sound alike drug alert. Refrigerate      2000            cefTRIAXone (ROCEPHIN) IVPB 1 g   Dose: 1 g  Freq: Once Route: IV  Indications of Use: URINARY TRACT INFECTION  Last Dose: 1 g (12/09/20 0059)  Start: 12/08/20 2052 End: 12/09/20 0129    Admin Instructions:   Caution: Look alike/sound alike drug alert. Refrigerate      0059            divalproex (DEPAKOTE) DR tablet 250 mg   Dose: 250 mg  Freq: Nightly Route: PO  Start: 12/09/20 0200    Admin Instructions:   Swallow tablets whole. Do not crush, split or chew.      (0205)   2100          enoxaparin (LOVENOX) syringe 40 mg   Dose: 40 mg  Freq: Every 24 Hours Route: SC  Indications of Use: PROPHYLAXIS OF VENOUS THROMBOEMBOLISM  Start: 12/09/20 0900    Admin Instructions:   Give subcutaneous in abdomen only. Do not massage site after injection.      8222            famotidine  (PEPCID) tablet 20 mg   Dose: 20 mg  Freq: Daily Route: PO  Start: 12/09/20 0900 End: 12/08/20 2311 2311-D/C'd           folic acid (FOLVITE) tablet 1 mg   Dose: 1 mg  Freq: Daily Route: PO  Start: 12/09/20 0900      0839            mirtazapine (REMERON) tablet 7.5 mg   Dose: 7.5 mg  Freq: Nightly Route: PO  Start: 12/09/20 0200      (0205)   2100          OLANZapine (zyPREXA) tablet 10 mg   Dose: 10 mg  Freq: Nightly Route: PO  Start: 12/09/20 0200    Admin Instructions:   Caution: Look alike/sound alike drug alert      (0205)   2100          vitamin B-12 (CYANOCOBALAMIN) tablet 1,000 mcg   Dose: 1,000 mcg  Freq: Daily Route: PO  Start: 12/09/20 0900      0839            Medications 12/07/20 12/08/20 12/09/20       Continuous Meds Sorted by Name  for Ena Lima as of 12/7/20 through 12/9/20   Legend:                          Inactive     Active     Other Encounter     Linked                 Medications 12/07/20 12/08/20 12/09/20   dextrose 5 % and sodium chloride 0.45 % with KCl 10 mEq/L infusion   Rate: 125 mL/hr Dose: 125 mL/hr  Freq: Continuous Route: IV  Last Dose: 125 mL/hr (12/09/20 0056)  Start: 12/08/20 2052 0056            Pharmacy to Dose enoxaparin (LOVENOX)   Freq: Continuous PRN Route: XX  PRN Reason: Consult  Indications Comment: Prophylaxis of Venous Thromboembolism  Start: 12/08/20 2310    Order specific questions:   Indication of use Prophylaxis             PRN Meds Sorted by Name  for Ena Lima as of 12/7/20 through 12/9/20   Legend:                          Inactive     Active     Other Encounter     Linked                 Medications 12/07/20 12/08/20 12/09/20   acetaminophen (TYLENOL) tablet 650 mg   Dose: 650 mg  Freq: Every 4 Hours PRN Route: PO  PRN Reasons: Mild Pain ,Fever  Start: 12/08/20 2310    Admin Instructions:   Do not exceed 4 grams of acetaminophen in a 24 hr period.    If given for pain, use the following pain scale:   Mild Pain = Pain Score of 1-3, CPOT 1-2  Moderate  Pain = Pain Score of 4-6, CPOT 3-4  Severe Pain = Pain Score of 7-10, CPOT 5-8  Do not exceed 4 grams of acetaminophen in a 24 hr period.    If given for pain, use the following pain scale:   Mild Pain = Pain Score of 1-3, CPOT 1-2  Moderate Pain = Pain Score of 4-6, CPOT 3-4  Severe Pain = Pain Score of 7-10, CPOT 5-8         melatonin tablet 3 mg   Dose: 3 mg  Freq: Nightly PRN Route: PO  PRN Reason: Sleep  Start: 12/08/20 2310         nitroglycerin (NITROSTAT) SL tablet 0.4 mg   Dose: 0.4 mg  Freq: Every 5 Minutes PRN Route: SL  PRN Reason: Chest Pain  PRN Comment: Only if SBP Greater Than 100  Start: 12/08/20 2310    Admin Instructions:   If Pain Unrelieved After 3 Doses Notify MD         ondansetron (ZOFRAN) tablet 4 mg   Dose: 4 mg  Freq: Every 4 Hours PRN Route: PO  PRN Reasons: Nausea,Vomiting  Start: 12/08/20 2310         Or  ondansetron (ZOFRAN) injection 4 mg   Dose: 4 mg  Freq: Every 4 Hours PRN Route: IV  PRN Reasons: Nausea,Vomiting  Start: 12/08/20 2310         Pharmacy to Dose enoxaparin (LOVENOX)   Freq: Continuous PRN Route: XX  PRN Reason: Consult  Indications Comment: Prophylaxis of Venous Thromboembolism  Start: 12/08/20 2310    Order specific questions:   Indication of use Prophylaxis         sodium chloride 0.9 % flush 10 mL   Dose: 10 mL  Freq: As Needed Route: IV  PRN Reason: Line Care  Start: 12/08/20 1927 End: 12/08/20 2310 2310-D/C'd           Medications 12/07/20 12/08/20 12/09/20                Nutrition Notes (last 24 hours) (Notes from 12/08/20 1252 through 12/09/20 1252)      Estela Patrick, RD at 12/09/20 1159      Consult Orders    1. Inpatient Nutrition Consult [[id number]] ordered by Mack Baldwin MD at 12/08/20 2335               Adult Nutrition  Assessment/PES    Patient Name:  Ena Lima  YOB: 1940  MRN: [id number]  Admit Date:  12/8/2020    Assessment Date:  12/9/2020  Nutrition assessment triggered by MST score-3, low BMI-18.4, skin report-multiple  "pressure injuries and RN consult.  EMR reviewed. Admitted with +COVID-19, Metabolic encephalopathy, dehydration, hypernatremia, dementia.  Nonverbal. Speech therapy evaluated-recommend pureed+thins. Ordered nutritional supplement-boost TID.  Will follow clinical course, po intake and nutritional needs.   Reason for Assessment     Row Name 12/09/20 1157          Reason for Assessment    Reason For Assessment  identified at risk by screening criteria;nurse/nurse practitioner consult     Diagnosis  +COVID-19, Metabolic encephalopathy, dehydration, hypernatremia, dementia     Identified At Risk by Screening Criteria  MST SCORE 2+;BMI;large or nonhealing wound, burn or pressure injury           Anthropometrics     Row Name 12/09/20 1157          Anthropometrics    Height  170.2 cm (67.01\")        Admit Weight    Admit Weight  53.5 kg (117 lb 15.1 oz)        Ideal Body Weight (IBW)    Ideal Body Weight (IBW) (kg)  61.88     % of Ideal Body Weight Assessment  -- 86%        Body Mass Index (BMI)    BMI Assessment  BMI 17-18.4: protein-energy malnutrition grade I BMI-18.4         Labs/Tests/Procedures/Meds     Row Name 12/09/20 1158          Labs/Procedures/Meds    Lab Results Reviewed  reviewed, pertinent     Lab Results Comments  Glu, Na, ALT, AST        Diagnostic Tests/Procedures    Diagnostic Test/Procedure Reviewed  reviewed, pertinent        Medications    Pertinent Medications Reviewed  reviewed, pertinent     Pertinent Medications Comments  folic acid, remeron, vitamin B12 ,D5% with NaCl, KCl         Physical Findings     Row Name 12/09/20 1158          Physical Findings    Overall Physical Appearance  underweight B=12     Skin  poor skin integrity/turgor;pressure injury         Estimated/Assessed Needs     Row Name 12/09/20 1158 12/09/20 1157       Calculation Measurements    Weight Used For Calculations  53.5 kg (117 lb 15.1 oz)      Height    170.2 cm (67.01\")       Estimated/Assessed Needs    Additional " Documentation  KCAL/KG (Group);Protein Requirements (Group);Fluid Requirements (Group)         KCAL/KG    KCAL/KG  25 Kcal/Kg (kcal);30 Kcal/Kg (kcal)      25 Kcal/Kg (kcal)  1337.5      30 Kcal/Kg (kcal)  1605         Protein Requirements    Weight Used For Protein Calculations  53.5 kg (117 lb 15.1 oz)      Est Protein Requirement Amount (gms/kg)  1.2 gm protein      Estimated Protein Requirements (gms/day)  64.2         Fluid Requirements    Fluid Requirements (mL/day)  1600      RDA Method (mL)  1600          Nutrition Prescription Ordered     Row Name 12/09/20 1159          Nutrition Prescription PO    Current PO Diet  Pureed     Fluid Consistency  Thin                 Problem/Interventions:  Problem 1     Row Name 12/09/20 1159          Nutrition Diagnoses Problem 1    Problem 1  Underweight     Etiology (related to)  MNT for Treatment/Condition     Signs/Symptoms (evidenced by)  BMI     BMI  18 - 18.9               Intervention Goal     Row Name 12/09/20 1159          Intervention Goal    General  Maintain nutrition;Reduce/improve symptoms;Meet nutritional needs for age/condition     PO  Tolerate PO     Weight  Maintain weight         Nutrition Intervention     Row Name 12/09/20 1159          Nutrition Intervention    RD/Tech Action  Care plan reviewd;Follow Tx progress;Recommend/ordered     Recommended/Ordered  Supplement         Nutrition Prescription     Row Name 12/09/20 1159          Nutrition Prescription PO    PO Prescription  Begin/change supplement     Supplement  Boost     Supplement Frequency  3 times a day     New PO Prescription Ordered?  Yes         Education/Evaluation     Row Name 12/09/20 1154          Education    Education  Will Instruct as appropriate        Monitor/Evaluation    Monitor  Per protocol           Electronically signed by:  Estela Patrick RD  12/09/20 12:00 EST    Electronically signed by Estela Patrick RD at 12/09/20 1202                  Orders (all)      Start      Ordered    12/09/20 0800  Up With Assistance  2 Times Daily      12/08/20 2311 12/09/20 0600  Incentive Spirometry  Every 4 Hours While Awake      12/08/20 2310 12/09/20 0141  NPO Diet  Diet Effective Now     Comments: Should remain in effect until a complete SLP evaluation occurs and a superceding MD order is received.    12/09/20 0140    12/09/20 0141  SLP Consult: Eval & Treat RN Dysphagia Screen Failed  Once      12/09/20 0140 12/09/20 0102  SLP Consult: Eval & Treat Communication Disorder  Once     Comments: Reason Why SLP Needed: pt is nonverbal    12/09/20 0101 12/09/20 0000  Vital Signs  Every 4 Hours     Comments: Per per hospital policy    12/08/20 2310 12/08/20 2339  Inpatient Case Management  Consult  Once     Provider:  (Not yet assigned)    12/08/20 2339 12/08/20 2339  Inpatient Nutrition Consult  Once     Provider:  (Not yet assigned)    12/08/20 2339 12/08/20 2312  Inpatient Palliative Care Nurse Consult  Once     Provider:  (Not yet assigned)    12/08/20 2311 12/08/20 2311  Telemetry - Maintain IV Access  Continuous      12/08/20 2310 12/08/20 2311  Continuous Cardiac Monitoring  Continuous,   Status:  Canceled      12/08/20 2310 12/08/20 2311  Telemetry - Pulse Oximetry  Continuous     Comments: If patient develops increasing oxygen requirements exceed 4 L/m call attending immediately.    12/08/20 2310 12/08/20 2311  May Be Off Telemetry for Tests  Continuous      12/08/20 2310 12/08/20 2311  ACLS Protocol For Life Threatening Dysrhythmias (Unless Code Status Indicates Otherwise)  Continuous      12/08/20 2310 12/08/20 2311  Notify Provider if ACLS Protocol Activated  Until Discontinued      12/08/20 2310 12/08/20 2311  Cardiac Monitoring  Continuous      12/08/20 2310 12/08/20 2311  Activity - Ad Naomi  Until Discontinued,   Status:  Canceled      12/08/20 2310 12/08/20 2311  Intake & Output  Every Shift      12/08/20 2310 12/08/20  2311  Patient Isolation Enhanced Droplet / Contact  Continuous      12/08/20 2310 12/08/20 2311  Notify Provider (Specify Parameters)  Until Discontinued     Comments: If requires more than 4 L/m oxygen to keep sat 90% or greater    12/08/20 2310 12/08/20 2311  Oxygen Therapy- Nasal Cannula; Titrate for SPO2: 94%, Greater Than or Equal To  Continuous      12/08/20 2310 12/08/20 2311  Diet Regular  Diet Effective Now,   Status:  Canceled      12/08/20 2310 12/08/20 2311  Aspiration Precautions  Continuous      12/08/20 2311 12/08/20 2311  PT Consult: Eval & Treat As Tolerated; Functional Mobility Below Baseline  Once      12/08/20 2311 12/08/20 2310  Pharmacy to Dose enoxaparin (LOVENOX)  Continuous PRN      12/08/20 2310 12/08/20 2310  Oxygen Therapy- Nasal Cannula; Titrate for SPO2: 90% - 95%  Continuous PRN,   Status:  Canceled     Comments: If Patient Develops Unresponsiveness, Acute Dyspnea, Cyanosis or Suspected Hypoxemia Start Continuous Pulse Ox Monitoring, Apply Oxygen & Notify Provider    12/08/20 2310 12/08/20 2140  Inpatient Admission  Once      12/08/20 2139 12/08/20 2051  LHA (on-call MD unless specified) Details  Once     Specialty:  Hospitalist  Provider:  Mack Baldwin MD    12/08/20 2050

## 2020-12-09 NOTE — PLAN OF CARE
Problem: Skin Injury Risk Increased  Goal: Skin Health and Integrity  Outcome: Ongoing, Progressing  Intervention: Optimize Skin Protection  Recent Flowsheet Documentation  Taken 12/8/2020 2316 by Loren Ramirez RN  Pressure Reduction Techniques: (waffle boots placed)   frequent weight shift encouraged   heels elevated off bed  Pressure Reduction Devices:   alternating pressure pump (ADD)   pressure-redistributing mattress utilized   specialty bed utilized     Problem: Fall Injury Risk  Goal: Absence of Fall and Fall-Related Injury  Outcome: Ongoing, Progressing  Intervention: Promote Injury-Free Environment  Recent Flowsheet Documentation  Taken 12/9/2020 0310 by Loren Ramirez RN  Safety Promotion/Fall Prevention:   safety round/check completed   room organization consistent   nonskid shoes/slippers when out of bed  Taken 12/9/2020 0129 by Loren Ramirez RN  Safety Promotion/Fall Prevention:   safety round/check completed   room organization consistent   nonskid shoes/slippers when out of bed   Goal Outcome Evaluation:  Plan of Care Reviewed With: patient  Progress: no change  Outcome Summary: Pt admitted tonight for UTI and dehydration; IVF and IV antibiotics were started; Pt is nonverbal at present moment she normally talks some; Pt is NPO till seen by speech; Wound RN has been consulted for multiple pressure injuries; Pt remains on RA; WIll continue to monitor

## 2020-12-09 NOTE — ED NOTES
Attempted iv access w/out success. I wore full protective equipment throughout this patient encounter including a face mask, eye shield and gloves. Hand hygiene/washing of hands was performed before donning protective equipment and after removal when leaving the room.       Amairani Billings RN  12/08/20 5973

## 2020-12-09 NOTE — PROGRESS NOTES
Discharge Planning Assessment  [facility]     Patient Name: Ena Lima  MRN: [id number]  Today's Date: 12/9/2020    Admit Date: 12/8/2020    Discharge Needs Assessment     Row Name 12/09/20 1023       Living Environment    Lives With  facility resident    Name(s) of Who Lives With Patient  [facility]    Current Living Arrangements  residential facility    Primary Care Provided by  other (see comments)    Provides Primary Care For  no one    Family Caregiver if Needed  child(rosey), adult    Family Caregiver Names  Antonia Ocasio [facility] [phone]    Quality of Family Relationships  supportive    Able to Return to Prior Arrangements  yes       Resource/Environmental Concerns    Resource/Environmental Concerns  none    Transportation Concerns  car, none       Transition Planning    Patient/Family Anticipates Transition to  long-term care facility    Patient/Family Anticipated Services at Transition  none    Transportation Anticipated  family or friend will provide       Discharge Needs Assessment    Readmission Within the Last 30 Days  no previous admission in last 30 days    Anticipated Changes Related to Illness  inability to care for self    Discharge Facility/Level of Care Needs  nursing facility, basic    Provided Post Acute Provider List?  N/A    N/A Provider List Comment  Not needed to return to [facility]    Provided Post Acute Provider Quality & Resource List?  N/A    N/A Quality & Resource List Comment  Not needed to return to [facility]    Current Discharge Risk  chronically ill;cognitively impaired        Discharge Plan     Row Name 12/09/20 1034       Plan    Plan  Return to [facility]    Patient/Family in Agreement with Plan  yes    Plan Comments  IMM 12/8/2020.  Spoke with Antonia Ocasio [facility] [phone] since patient has dementia and not able to respond to questions.  Face sheet verified. Patient is a resident at [facility]  [phone]. Plan is for her to return.  Spoke with Julio LOUISE at [facility], patient was diagnosed COVID on 11/10/2020 and continue to test positive.  Patient can return per Julio as long as she is one-person transfer and can feed herself.  Per daughter patient prefers to be called “Blue” and she is very hard of hearing.  Antonia Ocasio [facility] [phone] is also patient’s legal guardian.  Per daughter patient is full code at this time, but will take advisement from medical team if code status should be changed. Daughter will be able to transport at discharge. Will continue to monitor for discharge needs.        Continued Care and Services - Admitted Since 12/8/2020     Destination     Service Provider Request Status Selected Services Address Phone Fax Patient Preferred    [facility]  Pending - Request Sent N/A [id number] [facility] [id number] [phone] --                Demographic Summary     Row Name 12/09/20 1022       General Information    Admission Type  inpatient    Arrived From  emergency department    Required Notices Provided  Important Message from Medicare    Referral Source  admission list    Reason for Consult  discharge planning    Preferred Language  English     Used During This Interaction  no       Contact Information    Permission Granted to Share Info With  family/designee Antonia Ocasio [facility] [phone]        Functional Status     Row Name 12/09/20 1023       Functional Status    Usual Activity Tolerance  moderate    Current Activity Tolerance  fair    Functional Status Comments  Memory care       Functional Status, IADL    Medications  completely dependent    Meal Preparation  completely dependent    Housekeeping  completely dependent    Laundry  completely dependent    Shopping  completely dependent       Mental Status Summary    Recent Changes in Mental Status/Cognitive Functioning  unable to assess       Employment/    Employment Status  retired         Psychosocial    No documentation.       Abuse/Neglect    No documentation.       Legal    No documentation.       Substance Abuse    No documentation.       Patient Forms    No documentation.           Brianne Whitney RN

## 2020-12-09 NOTE — PROGRESS NOTES
"Pharmacy Consult - Lovenox    Ena Lima has been consulted for pharmacy to dose enoxaparin for VTE ppx per Dr. Baldwin's request.     Relevant clinical data and objective history reviewed: BMI 18.48  80 y.o. female 170.2 cm (67.01\") 53.5 kg (118 lb)      Past Medical History:   Diagnosis Date   • Arthritis    • Cellulitis    • Dementia (CMS/HCC)      is allergic to adhesive tape and sulfa antibiotics.    Lab Results   Component Value Date    WBC 10.23 12/09/2020    HGB 13.2 12/09/2020    HCT 41.5 12/09/2020    MCV 88.9 12/09/2020     12/09/2020     Lab Results   Component Value Date    GLUCOSE 130 (H) 12/09/2020    CALCIUM 8.7 12/09/2020     (H) 12/09/2020    K 3.7 12/09/2020    CO2 26.4 12/09/2020     (H) 12/09/2020    BUN 53 (H) 12/09/2020    CREATININE 0.84 12/09/2020    EGFRIFNONA 65 12/09/2020    BCR 63.1 (H) 12/09/2020    ANIONGAP 9.6 12/09/2020       Estimated Creatinine Clearance: 45.9 mL/min (by C-G formula based on SCr of 0.84 mg/dL).    Active Inpatient Anticoagulation Orders: lovenox 40 ms subq daily    Assessment/Plan    Continue patient on enoxaparin 40 mg subcutaneous every 24 hours, adjusted for renal function and weight (BMI 18.48, wt 53.5 kg), will monitor inflammatory markers due to pt is COVID +. Pharmacy will continue to follow.     Xin Jenkins [facility]    "

## 2020-12-09 NOTE — DISCHARGE PLACEMENT REQUEST
"Ena Lima (80 y.o. Female)     Date of Birth Social Security Number Address Home Phone MRN    1940  [id number] [address] [facility] [address] [phone] [id number]    Hoahaoism Marital Status          Sikh        Admission Date Admission Type Admitting Provider Attending Provider Department, Room/Bed    12/8/20 Emergency RayMack MD Broaddus, Emmett J., MD [address], [address]    Discharge Date Discharge Disposition Discharge Destination                       Attending Provider: Betito Middleton MD    Allergies: Adhesive Tape, Sulfa Antibiotics    Isolation: Enh Drop/Con   Infection: COVID (confirmed) (12/08/20)   Code Status: CPR    Ht: 170.2 cm (67\")   Wt: 53.5 kg (118 lb)    Admission Cmt: None   Principal Problem: COVID-19 virus infection [U07.1]                 Active Insurance as of 12/8/2020     Primary Coverage     Payor Plan Insurance Group Employer/Plan Group    [employer] [place] MEDICARE REPLACEMENT [facility] MEDICARE REPLACEMENT      Payor Plan Address Payor Plan Phone Number Payor Plan Fax Number Effective Dates    [address] [address] [phone]  2/26/2020 - None Entered    [facility] [id number]       Subscriber Name Subscriber Birth Date Member ID       Ena Lima 1940 [id number]                 Emergency Contacts      (Rel.) Home Phone Work Phone Mobile Phone    RICARDO RAMIREZ (Guardian) [phone] -- --            "

## 2020-12-09 NOTE — ED NOTES
Spoke with Antonia (daughter) via phone at [phone] for update on pt status and plan.      Abbi Downey RN  12/08/20 1785

## 2020-12-09 NOTE — ED PROVIDER NOTES
EMERGENCY DEPARTMENT ENCOUNTER    Room Number:  42/42  Date of encounter:  12/8/2020  PCP: Tania Lam APRN  Historian: Patient     I used full protective equipment while examining this patient.  This includes face mask, gloves and protective eyewear.  I washed my hands before entering the room and immediately upon leaving the room      HPI:  Chief Complaint: Altered mental status  A complete HPI/ROS/PMH/PSH/SH/FH are unobtainable due to: Chronic dementia    Context: Ena Lima is a 80 y.o. female who presents to the ED c/o altered mental status.  Patient sent from nursing home for not acting herself.  Patient has had decreased appetite not acting normal for her.  Patient does have chronic dementia and has had a nursing facility.  I am unable to get any history from patient as she does not answer questions nor follow commands.      PAST MEDICAL HISTORY  Active Ambulatory Problems     Diagnosis Date Noted   • No Active Ambulatory Problems     Resolved Ambulatory Problems     Diagnosis Date Noted   • No Resolved Ambulatory Problems     Past Medical History:   Diagnosis Date   • Arthritis    • Dementia (CMS/HCC)          PAST SURGICAL HISTORY  Past Surgical History:   Procedure Laterality Date   • HERNIA REPAIR           FAMILY HISTORY  No family history on file.      SOCIAL HISTORY  Social History     Socioeconomic History   • Marital status:      Spouse name: Not on file   • Number of children: Not on file   • Years of education: Not on file   • Highest education level: Not on file   Tobacco Use   • Smoking status: Unknown If Ever Smoked   Substance and Sexual Activity   • Alcohol use: Defer   • Drug use: Defer   • Sexual activity: Defer         ALLERGIES  Adhesive tape and Sulfa antibiotics       REVIEW OF SYSTEMS  Review of Systems   Unable to perform ROS: Dementia           PHYSICAL EXAM    I have reviewed the triage vital signs and nursing notes.    ED Triage Vitals [12/08/20 1901]    Temp Heart Rate Resp BP SpO2   98.2 °F (36.8 °C) 87 18 105/69 95 %      Temp src Heart Rate Source Patient Position BP Location FiO2 (%)   Tympanic -- -- -- --       Physical Exam  GENERAL: Elderly female in no obvious distress, she will not answer questions nor follow commands  HENT: nares patent atraumatic  EYES: no scleral icterus  CV: regular rhythm, regular rate-no murmur  RESPIRATORY: normal effort, clear to auscultation bilaterally-saturations 96% on room air  ABDOMEN: soft, nontender to palpation  MUSCULOSKELETAL: no deformity  NEURO: Strength-mild generalized weakness without focal deficit.  Sensation appears to be grossly intact and she does withdraw from painful stimuli.  Coordination appears to be grossly intact but limited by patient's dementia.  Mental status-patient moves all extremities but will not really answer questions or follow commands.  She is essentially nonverbal.  SKIN: warm, dry-no worrisome rashes are noted      LAB RESULTS  Recent Results (from the past 24 hour(s))   Comprehensive Metabolic Panel    Collection Time: 12/08/20  7:34 PM    Specimen: Blood   Result Value Ref Range    Glucose 101 (H) 65 - 99 mg/dL    BUN 48 (H) 8 - 23 mg/dL    Creatinine 0.99 0.57 - 1.00 mg/dL    Sodium 158 (H) 136 - 145 mmol/L    Potassium 4.5 3.5 - 5.2 mmol/L    Chloride 119 (H) 98 - 107 mmol/L    CO2 28.3 22.0 - 29.0 mmol/L    Calcium 9.1 8.6 - 10.5 mg/dL    Total Protein 8.0 6.0 - 8.5 g/dL    Albumin 3.10 (L) 3.50 - 5.20 g/dL    ALT (SGPT) 75 (H) 1 - 33 U/L    AST (SGOT) 83 (H) 1 - 32 U/L    Alkaline Phosphatase 85 39 - 117 U/L    Total Bilirubin 0.5 0.0 - 1.2 mg/dL    eGFR Non African Amer 54 (L) >60 mL/min/1.73    Globulin 4.9 gm/dL    A/G Ratio 0.6 g/dL    BUN/Creatinine Ratio 48.5 (H) 7.0 - 25.0    Anion Gap 10.7 5.0 - 15.0 mmol/L   Valproic Acid Level, Total    Collection Time: 12/08/20  7:34 PM    Specimen: Blood   Result Value Ref Range    Valproic Acid 19.0 (L) 50.0 - 125.0 mcg/mL   CBC Auto  Differential    Collection Time: 12/08/20  7:34 PM    Specimen: Blood   Result Value Ref Range    WBC 10.69 3.40 - 10.80 10*3/mm3    RBC 5.70 (H) 3.77 - 5.28 10*6/mm3    Hemoglobin 16.2 (H) 12.0 - 15.9 g/dL    Hematocrit 49.6 (H) 34.0 - 46.6 %    MCV 87.0 79.0 - 97.0 fL    MCH 28.4 26.6 - 33.0 pg    MCHC 32.7 31.5 - 35.7 g/dL    RDW 12.9 12.3 - 15.4 %    RDW-SD 39.8 37.0 - 54.0 fl    MPV 10.4 6.0 - 12.0 fL    Platelets 242 140 - 450 10*3/mm3    Neutrophil % 69.1 42.7 - 76.0 %    Lymphocyte % 18.6 (L) 19.6 - 45.3 %    Monocyte % 11.1 5.0 - 12.0 %    Eosinophil % 0.1 (L) 0.3 - 6.2 %    Basophil % 0.3 0.0 - 1.5 %    Immature Grans % 0.8 (H) 0.0 - 0.5 %    Neutrophils, Absolute 7.38 (H) 1.70 - 7.00 10*3/mm3    Lymphocytes, Absolute 1.99 0.70 - 3.10 10*3/mm3    Monocytes, Absolute 1.19 (H) 0.10 - 0.90 10*3/mm3    Eosinophils, Absolute 0.01 0.00 - 0.40 10*3/mm3    Basophils, Absolute 0.03 0.00 - 0.20 10*3/mm3    Immature Grans, Absolute 0.09 (H) 0.00 - 0.05 10*3/mm3    nRBC 0.1 0.0 - 0.2 /100 WBC   Urinalysis With Culture If Indicated - Urine, Catheter In/Out    Collection Time: 12/08/20  7:43 PM    Specimen: Urine, Catheter In/Out   Result Value Ref Range    Color, UA Yellow Yellow, Straw    Appearance, UA Cloudy (A) Clear    pH, UA <=5.0 5.0 - 8.0    Specific Gravity, UA 1.018 1.005 - 1.030    Glucose, UA Negative Negative    Ketones, UA Negative Negative    Bilirubin, UA Negative Negative    Blood, UA Trace (A) Negative    Protein, UA Negative Negative    Leuk Esterase, UA Moderate (2+) (A) Negative    Nitrite, UA Negative Negative    Urobilinogen, UA 0.2 E.U./dL 0.2 - 1.0 E.U./dL   Urinalysis, Microscopic Only - Urine, Catheter In/Out    Collection Time: 12/08/20  7:43 PM    Specimen: Urine, Catheter In/Out   Result Value Ref Range    RBC, UA 0-2 None Seen, 0-2 /HPF    WBC, UA Too Numerous to Count (A) None Seen, 0-2 /HPF    Bacteria, UA 4+ (A) None Seen /HPF    Squamous Epithelial Cells, UA 0-2 None Seen, 0-2 /HPF     Hyaline Casts, UA 0-2 None Seen /LPF    Methodology Automated Microscopy    Respiratory Panel PCR w/COVID-19(SARS-CoV-2) [facility] In-House, NP Swab in UTM/VTM, 3-4 HR TAT - Swab, Nasopharynx    Collection Time: 12/08/20  8:22 PM    Specimen: Nasopharynx; Swab   Result Value Ref Range    ADENOVIRUS, PCR Not Detected Not Detected    Coronavirus 229E Not Detected Not Detected    Coronavirus HKU1 Not Detected Not Detected    Coronavirus NL63 Not Detected Not Detected    Coronavirus OC43 Not Detected Not Detected    COVID19 Detected (C) Not Detected - Ref. Range    Human Metapneumovirus Not Detected Not Detected    Human Rhinovirus/Enterovirus Not Detected Not Detected    Influenza A PCR Not Detected Not Detected    Influenza B PCR Not Detected Not Detected    Parainfluenza Virus 1 Not Detected Not Detected    Parainfluenza Virus 2 Not Detected Not Detected    Parainfluenza Virus 3 Not Detected Not Detected    Parainfluenza Virus 4 Not Detected Not Detected    RSV, PCR Not Detected Not Detected    Bordetella pertussis pcr Not Detected Not Detected    Bordetella parapertussis PCR Not Detected Not Detected    Chlamydophila pneumoniae PCR Not Detected Not Detected    Mycoplasma pneumo by PCR Not Detected Not Detected       Ordered the above labs and independently reviewed the results.      RADIOLOGY  Xr Chest 1 View    Result Date: 12/8/2020  ONE VIEW PORTABLE CHEST  HISTORY: Fever and confusion.  FINDINGS: There is prominent elevation of the left hemidiaphragm unchanged from 02/26/2020. The lungs are clear. There is mild cardiomegaly that is unchanged and no acute abnormality is seen.  This report was finalized on 12/8/2020 8:56 PM by Dr. William Rogers M.D.        I ordered the above noted radiological studies. Reviewed by me and discussed with radiologist.  See dictation for official radiology interpretation.      PROCEDURES  Procedures      MEDICATIONS GIVEN IN ER    Medications   sodium chloride 0.9  % flush 10 mL (has no administration in time range)   dextrose 5 % and sodium chloride 0.45 % with KCl 10 mEq/L infusion (has no administration in time range)   cefTRIAXone (ROCEPHIN) IVPB 1 g (has no administration in time range)         PROGRESS, DATA ANALYSIS, CONSULTS, AND MEDICAL DECISION MAKING    All labs have been independently reviewed by me.  All radiology studies have been reviewed by me and discussed with radiologist dictating the report.   EKG's independently viewed and interpreted by me.  Discussion below represents my analysis of pertinent findings related to patient's condition, differential diagnosis, treatment plan and final disposition.      ED Course as of Dec 08 2140   Tue Dec 08, 2020   1918 DM-reviewed prior medical records and note the patient was seen in our ER and discharged with a diagnosis of cystitis in February of this year.    [DB]   1929 YEB-51-xgh[age]-old female with history of dementia with behavioral disturbance presents with likely encephalopathy.  Patient is not acting herself and seems to be possibly more confused than usual.  There is nursing report of a fever greater than 100.4 from the nursing home.  We will check for signs of infection to include UTI, upper respiratory infection and Covid.    [DB]   2048 Patient pretty hypernatremic with sodium of 158.  Also urinalysis is suggestive of UTI with 4+ bacteria.  We will go ahead and admit to the hospital for UTI and hypernatremia in patient with dementia.    [DB]   2138 Chest x-ray as discussed with the radiologist is unremarkable.    Covid testing did come back positive for COVID-19.    I discussed results of testing and need for hospitalization with  from [facility] who will admit on behalf of Dr. Mack Baldwin.    [DB]      ED Course User Index  [DB] Isiah Mendes MD       AS OF 21:40 EST VITALS:    BP - 110/73  HR - 78  TEMP - 98.2 °F (36.8 °C) (Tympanic)  O2 SATS - 94%      DIAGNOSIS  Final diagnoses:   Hypernatremia    Urinary tract infection without hematuria, site unspecified   Dementia with behavioral disturbance, unspecified dementia type (CMS/[facility])   COVID-19 virus infection         DISPOSITION  Admission         Isiah Mendes MD  12/08/20 3762

## 2020-12-09 NOTE — ED NOTES
Attempted to call report to floor. Nurse busy and will call back.      Abbi Downey RN  12/08/20 3766

## 2020-12-09 NOTE — ED NOTES
IV team is going to see pt on the floor. Trinh LEGER notified and will administer medications and fluids due.      Abbi Downey RN  12/08/20 3987

## 2020-12-09 NOTE — ED NOTES
Spoke with family regarding patient status and was provided patient access code.  Informed family of enhanced visitor policy and they verbalized understanding.  Daughter can be reached at number in emergency contact.               Stephen Case, RN  12/08/20 0877

## 2020-12-10 LAB
ALBUMIN SERPL-MCNC: 2.1 G/DL (ref 3.5–5.2)
ALBUMIN/GLOB SERPL: 0.6 G/DL
ALP SERPL-CCNC: 75 U/L (ref 39–117)
ALT SERPL W P-5'-P-CCNC: 102 U/L (ref 1–33)
ANION GAP SERPL CALCULATED.3IONS-SCNC: 5.4 MMOL/L (ref 5–15)
ANION GAP SERPL CALCULATED.3IONS-SCNC: 5.6 MMOL/L (ref 5–15)
AST SERPL-CCNC: 156 U/L (ref 1–32)
BACTERIA SPEC AEROBE CULT: ABNORMAL
BASOPHILS # BLD AUTO: 0.02 10*3/MM3 (ref 0–0.2)
BASOPHILS NFR BLD AUTO: 0.3 % (ref 0–1.5)
BILIRUB SERPL-MCNC: 0.2 MG/DL (ref 0–1.2)
BUN SERPL-MCNC: 29 MG/DL (ref 8–23)
BUN SERPL-MCNC: 35 MG/DL (ref 8–23)
BUN/CREAT SERPL: 39.2 (ref 7–25)
BUN/CREAT SERPL: 44.3 (ref 7–25)
CALCIUM SPEC-SCNC: 7.8 MG/DL (ref 8.6–10.5)
CALCIUM SPEC-SCNC: 8.2 MG/DL (ref 8.6–10.5)
CHLORIDE SERPL-SCNC: 120 MMOL/L (ref 98–107)
CHLORIDE SERPL-SCNC: 122 MMOL/L (ref 98–107)
CK SERPL-CCNC: 122 U/L (ref 20–180)
CO2 SERPL-SCNC: 25.6 MMOL/L (ref 22–29)
CO2 SERPL-SCNC: 27.4 MMOL/L (ref 22–29)
CREAT SERPL-MCNC: 0.74 MG/DL (ref 0.57–1)
CREAT SERPL-MCNC: 0.79 MG/DL (ref 0.57–1)
CRP SERPL-MCNC: 7.58 MG/DL (ref 0–0.5)
DEPRECATED RDW RBC AUTO: 42.5 FL (ref 37–54)
EOSINOPHIL # BLD AUTO: 0.17 10*3/MM3 (ref 0–0.4)
EOSINOPHIL NFR BLD AUTO: 2.2 % (ref 0.3–6.2)
ERYTHROCYTE [DISTWIDTH] IN BLOOD BY AUTOMATED COUNT: 13 % (ref 12.3–15.4)
FERRITIN SERPL-MCNC: 710 NG/ML (ref 13–150)
GFR SERPL CREATININE-BSD FRML MDRD: 70 ML/MIN/1.73
GFR SERPL CREATININE-BSD FRML MDRD: 76 ML/MIN/1.73
GLOBULIN UR ELPH-MCNC: 3.6 GM/DL
GLUCOSE SERPL-MCNC: 107 MG/DL (ref 65–99)
GLUCOSE SERPL-MCNC: 84 MG/DL (ref 65–99)
HCT VFR BLD AUTO: 34.7 % (ref 34–46.6)
HGB BLD-MCNC: 11.2 G/DL (ref 12–15.9)
IMM GRANULOCYTES # BLD AUTO: 0.09 10*3/MM3 (ref 0–0.05)
IMM GRANULOCYTES NFR BLD AUTO: 1.2 % (ref 0–0.5)
LYMPHOCYTES # BLD AUTO: 1.63 10*3/MM3 (ref 0.7–3.1)
LYMPHOCYTES NFR BLD AUTO: 21.1 % (ref 19.6–45.3)
MCH RBC QN AUTO: 28.9 PG (ref 26.6–33)
MCHC RBC AUTO-ENTMCNC: 32.3 G/DL (ref 31.5–35.7)
MCV RBC AUTO: 89.4 FL (ref 79–97)
MONOCYTES # BLD AUTO: 0.67 10*3/MM3 (ref 0.1–0.9)
MONOCYTES NFR BLD AUTO: 8.7 % (ref 5–12)
NEUTROPHILS NFR BLD AUTO: 5.16 10*3/MM3 (ref 1.7–7)
NEUTROPHILS NFR BLD AUTO: 66.5 % (ref 42.7–76)
NRBC BLD AUTO-RTO: 0.3 /100 WBC (ref 0–0.2)
PLATELET # BLD AUTO: 213 10*3/MM3 (ref 140–450)
PMV BLD AUTO: 10.8 FL (ref 6–12)
POTASSIUM SERPL-SCNC: 3.9 MMOL/L (ref 3.5–5.2)
POTASSIUM SERPL-SCNC: 3.9 MMOL/L (ref 3.5–5.2)
PROT SERPL-MCNC: 5.7 G/DL (ref 6–8.5)
RBC # BLD AUTO: 3.88 10*6/MM3 (ref 3.77–5.28)
SODIUM SERPL-SCNC: 151 MMOL/L (ref 136–145)
SODIUM SERPL-SCNC: 155 MMOL/L (ref 136–145)
SODIUM UR-SCNC: 35 MMOL/L
TROPONIN T SERPL-MCNC: 0.06 NG/ML (ref 0–0.03)
WBC # BLD AUTO: 7.74 10*3/MM3 (ref 3.4–10.8)

## 2020-12-10 PROCEDURE — 82550 ASSAY OF CK (CPK): CPT | Performed by: HOSPITALIST

## 2020-12-10 PROCEDURE — 84300 ASSAY OF URINE SODIUM: CPT | Performed by: HOSPITALIST

## 2020-12-10 PROCEDURE — 82728 ASSAY OF FERRITIN: CPT | Performed by: HOSPITALIST

## 2020-12-10 PROCEDURE — 80053 COMPREHEN METABOLIC PANEL: CPT | Performed by: HOSPITALIST

## 2020-12-10 PROCEDURE — 86140 C-REACTIVE PROTEIN: CPT | Performed by: HOSPITALIST

## 2020-12-10 PROCEDURE — 85025 COMPLETE CBC W/AUTO DIFF WBC: CPT | Performed by: HOSPITALIST

## 2020-12-10 PROCEDURE — 84484 ASSAY OF TROPONIN QUANT: CPT | Performed by: HOSPITALIST

## 2020-12-10 PROCEDURE — 97110 THERAPEUTIC EXERCISES: CPT

## 2020-12-10 PROCEDURE — 25010000002 ENOXAPARIN PER 10 MG: Performed by: HOSPITALIST

## 2020-12-10 PROCEDURE — 97162 PT EVAL MOD COMPLEX 30 MIN: CPT

## 2020-12-10 RX ORDER — CEFDINIR 300 MG/1
300 CAPSULE ORAL EVERY 12 HOURS SCHEDULED
Status: DISCONTINUED | OUTPATIENT
Start: 2020-12-10 | End: 2020-12-12 | Stop reason: HOSPADM

## 2020-12-10 RX ORDER — DEXTROSE MONOHYDRATE 50 MG/ML
40 INJECTION, SOLUTION INTRAVENOUS CONTINUOUS
Status: DISCONTINUED | OUTPATIENT
Start: 2020-12-10 | End: 2020-12-11

## 2020-12-10 RX ORDER — CASTOR OIL AND BALSAM, PERU 788; 87 MG/G; MG/G
OINTMENT TOPICAL EVERY 12 HOURS SCHEDULED
Status: DISCONTINUED | OUTPATIENT
Start: 2020-12-10 | End: 2020-12-12 | Stop reason: HOSPADM

## 2020-12-10 RX ADMIN — DIVALPROEX SODIUM 250 MG: 250 TABLET, DELAYED RELEASE ORAL at 21:32

## 2020-12-10 RX ADMIN — ENOXAPARIN SODIUM 40 MG: 40 INJECTION SUBCUTANEOUS at 08:56

## 2020-12-10 RX ADMIN — Medication 1000 MCG: at 08:56

## 2020-12-10 RX ADMIN — OLANZAPINE 10 MG: 10 TABLET ORAL at 21:32

## 2020-12-10 RX ADMIN — DEXTROSE MONOHYDRATE 40 ML/HR: 50 INJECTION, SOLUTION INTRAVENOUS at 21:32

## 2020-12-10 RX ADMIN — POTASSIUM CHLORIDE, DEXTROSE MONOHYDRATE AND SODIUM CHLORIDE 75 ML/HR: 75; 5; 450 INJECTION, SOLUTION INTRAVENOUS at 14:29

## 2020-12-10 RX ADMIN — SODIUM CHLORIDE 250 ML: 9 INJECTION, SOLUTION INTRAVENOUS at 13:11

## 2020-12-10 RX ADMIN — FOLIC ACID 1 MG: 1 TABLET ORAL at 08:56

## 2020-12-10 RX ADMIN — CASTOR OIL AND BALSAM, PERU 5 G: 788; 87 OINTMENT TOPICAL at 21:32

## 2020-12-10 RX ADMIN — CEFDINIR 300 MG: 300 CAPSULE ORAL at 21:32

## 2020-12-10 RX ADMIN — MIRTAZAPINE 7.5 MG: 15 TABLET, FILM COATED ORAL at 23:49

## 2020-12-10 NOTE — NURSING NOTE
Lab called again regarding STAT BMP and stated that the machine had gone down, but it is up and running now; BMP should be done soon.; Will continue to watch for the results

## 2020-12-10 NOTE — PLAN OF CARE
Problem: Adult Inpatient Plan of Care  Goal: Absence of Hospital-Acquired Illness or Injury  Outcome: Ongoing, Progressing  Intervention: Identify and Manage Fall Risk  Recent Flowsheet Documentation  Taken 12/10/2020 0408 by Loren Ramirez RN  Safety Promotion/Fall Prevention:   safety round/check completed   room organization consistent   nonskid shoes/slippers when out of bed  Taken 12/10/2020 0226 by Loren Ramirez RN  Safety Promotion/Fall Prevention:   safety round/check completed   room organization consistent   nonskid shoes/slippers when out of bed  Taken 12/9/2020 2353 by Loren Ramirez RN  Safety Promotion/Fall Prevention:   room organization consistent   safety round/check completed   nonskid shoes/slippers when out of bed  Taken 12/9/2020 2151 by Loren Ramirez RN  Safety Promotion/Fall Prevention:   room organization consistent   safety round/check completed   nonskid shoes/slippers when out of bed  Taken 12/9/2020 2055 by Loren Ramirez RN  Safety Promotion/Fall Prevention:   activity supervised   nonskid shoes/slippers when out of bed   safety round/check completed   room organization consistent  Intervention: Prevent Skin Injury  Recent Flowsheet Documentation  Taken 12/10/2020 0408 by Loren Ramirez RN  Body Position: position maintained  Taken 12/10/2020 0226 by Loren Ramirez RN  Body Position: position maintained  Taken 12/9/2020 2353 by Loren Ramirez RN  Body Position: position maintained  Taken 12/9/2020 2151 by Loren Ramirez RN  Body Position: position changed independently  Taken 12/9/2020 2055 by Loren Ramirez RN  Body Position: position maintained  Intervention: Prevent and Manage VTE (venous thromboembolism) Risk  Recent Flowsheet Documentation  Taken 12/9/2020 2055 by Loren Ramirez RN  VTE Prevention/Management: dorsiflexion/plantar flexion performed  Intervention: Prevent Infection  Recent Flowsheet  Documentation  Taken 12/10/2020 0408 by Loren Ramirez RN  Infection Prevention: rest/sleep promoted  Taken 12/10/2020 0226 by Loren Ramirez RN  Infection Prevention: rest/sleep promoted  Taken 12/9/2020 2353 by Loren Ramirez RN  Infection Prevention: rest/sleep promoted  Taken 12/9/2020 2151 by Loren Ramirez RN  Infection Prevention: rest/sleep promoted  Taken 12/9/2020 2055 by Loren Ramirez RN  Infection Prevention: rest/sleep promoted     Problem: Skin Injury Risk Increased  Goal: Skin Health and Integrity  Outcome: Ongoing, Progressing  Intervention: Optimize Skin Protection  Recent Flowsheet Documentation  Taken 12/10/2020 0408 by Loren Ramirez RN  Head of Bed (HOB): HOB elevated  Taken 12/10/2020 0226 by Loren Ramirez RN  Head of Bed (HOB): HOB elevated  Taken 12/9/2020 2353 by Loren Ramirez RN  Head of Bed (HOB): HOB elevated  Taken 12/9/2020 2151 by Loren Ramirez RN  Head of Bed (HOB): HOB elevated  Taken 12/9/2020 2055 by Loren Ramirez RN  Pressure Reduction Techniques:   frequent weight shift encouraged   heels elevated off bed  Head of Bed (HOB): HOB elevated  Pressure Reduction Devices:   alternating pressure pump (ADD)   pressure-redistributing mattress utilized   specialty bed utilized  Skin Protection: adhesive use limited     Problem: Fall Injury Risk  Goal: Absence of Fall and Fall-Related Injury  Outcome: Ongoing, Progressing  Intervention: Promote Injury-Free Environment  Recent Flowsheet Documentation  Taken 12/10/2020 0408 by Loren Ramirez RN  Safety Promotion/Fall Prevention:   safety round/check completed   room organization consistent   nonskid shoes/slippers when out of bed  Taken 12/10/2020 0226 by Loren Ramirez RN  Safety Promotion/Fall Prevention:   safety round/check completed   room organization consistent   nonskid shoes/slippers when out of bed  Taken 12/9/2020 2353 by Loren Ramirez RN  Safety  Promotion/Fall Prevention:   room organization consistent   safety round/check completed   nonskid shoes/slippers when out of bed  Taken 12/9/2020 2151 by Loren Ramirez, RN  Safety Promotion/Fall Prevention:   room organization consistent   safety round/check completed   nonskid shoes/slippers when out of bed  Taken 12/9/2020 2055 by Loren Ramirez, RN  Safety Promotion/Fall Prevention:   activity supervised   nonskid shoes/slippers when out of bed   safety round/check completed   room organization consistent   Goal Outcome Evaluation:  Plan of Care Reviewed With: patient  Progress: no change  Outcome Summary: VSS; Pt woke up and took her night time pills; Remains on RA; Repeat Na down to 153 and order to decrease IVF to 75cc/hr;IV antibiotics given; Wound consulted to see for multiple pressure ulcers; Will continue to monitor

## 2020-12-10 NOTE — PLAN OF CARE
Goal Outcome Evaluation:  Plan of Care Reviewed With: patient  Progress: no change  Outcome Summary: Pt. is a 80 year old Female admitted to the hospital from a [facility] with increased confusion and COVID infection.  Pt. does have a h/o dementia per chart review.  Pt. currently presents with decreased strength, decreased ROM, decreased balance, and decreased tolerance to functional activity. Pt. will benefit from skilled inpt. P.T. to address her functional deficits and to assist pt. in regaining her maximum level of independence with functional mobility. Pt.'s progression will depend on her ability to follow commands and cooperate with P.T.  P.T. prognosis at this time is fair.    Patient was wearing a face mask during this therapy encounter. Therapist used appropriate personal protective equipment including eye protection, mask, and gloves.  Mask used was standard procedure mask. Appropriate PPE was worn during the entire therapy session. Hand hygiene was completed before and after therapy session. Patient is in enhanced droplet precautions.

## 2020-12-10 NOTE — PROGRESS NOTES
"DAILY PROGRESS NOTE  [facility]    Patient Identification:  Name: Ena Lima  Age: 80 y.o.  Sex: female  :  1940  MRN: [id number]         Primary Care Physician: Tania Lam APRN      Subjective  Very poor historian.  Patient states \"I I am okay\".  Can give no specifics.    Objective:  General Appearance:  In no acute distress, not in pain and comfortable (Thin and frail.).    Vital signs: (most recent): Blood pressure 102/62, pulse 88, temperature 98.5 °F (36.9 °C), temperature source Oral, resp. rate 18, height 170.2 cm (67.01\"), weight 53.5 kg (118 lb), SpO2 97 %.    Lungs:  Normal effort and normal respiratory rate.  Breath sounds clear to auscultation.  (Presently on room air and O2 sats are 95%.)  Heart: Normal rate.  Regular rhythm.    Extremities: There is no dependent edema.    Neurological: (Sleeping on entering the room.  Awakens fairly easily.  Speaks very little.  Reasonable eye contact.  Smiles frequently.  Oriented only to person.  When asked her where she was she responds \"I like it very much\".).    Skin:  Warm and dry.                Vital signs in last 24 hours:  Temp:  [96.2 °F (35.7 °C)-98.5 °F (36.9 °C)] 98.5 °F (36.9 °C)  Heart Rate:  [66-88] 88  Resp:  [17-20] 18  BP: ()/(62-79) 102/62    Intake/Output:    Intake/Output Summary (Last 24 hours) at 2020  Last data filed at 2020 1744  Gross per 24 hour   Intake 1790 ml   Output --   Net 1790 ml         Results from last 7 days   Lab Units 20  0610 20  193   WBC 10*3/mm3 10.23 10.69   HEMOGLOBIN g/dL 13.2 16.2*   PLATELETS 10*3/mm3 254 242     Results from last 7 days   Lab Units 2010 20   SODIUM mmol/L 158* 158*   POTASSIUM mmol/L 3.7 4.5   CHLORIDE mmol/L 122* 119*   CO2 mmol/L 26.4 28.3   BUN mg/dL 53* 48*   CREATININE mg/dL 0.84 0.99   GLUCOSE mg/dL 130* 101*   Estimated Creatinine Clearance: 45.9 mL/min (by C-G formula based on SCr of 0.84 " "mg/dL).  Results from last 7 days   Lab Units 12/09/20  0610 12/08/20  1934   CALCIUM mg/dL 8.7 9.1   ALBUMIN g/dL 2.80* 3.10*     Results from last 7 days   Lab Units 12/09/20  0610 12/08/20  1934   ALBUMIN g/dL 2.80* 3.10*   BILIRUBIN mg/dL 0.3 0.5   ALK PHOS U/L 72 85   AST (SGOT) U/L 61* 83*   ALT (SGPT) U/L 59* 75*       Assessment:    COVID-19 virus infection: Remained stable with good room air O2 sats.  Inflammatory parameters friendly however.  Continue to monitor.    Acute cystitis without hematuria: Continue Rocephin.    Metabolic encephalopathy: Appears to be improved.    Dehydration:      Hypernatremia: Patient presently on D5W.  Check stat BMP and adjust IV fluids as needed.     Dementia without behavioral disturbance (CMS/HCC)    All problems new to this examiner.    Plan:  Please see above    Patient was wearing facemask when I entered the room and throughout our encounter.  I wore protective equipment throughout this patient encounter including a face mask and gloves.  Hand hygiene was performed before donning protective equipment and after removal when leaving the room.      Betito Middleton MD  12/9/2020  20:27 EST    Addendum:  Stat BMP ordered at 8:20 PM.  It is now 11:40 PM and still no result.  Attempting to call lab but can only get a recording stating \"enter your ID followed by #\"  Electronically signed by Betito Middleton MD, 12/09/20, 11:42 PM EST.    "

## 2020-12-10 NOTE — NURSING NOTE
Lab called regarding STAT BMP that was ordered at 2020; Stated that it was being put on the line to be read.

## 2020-12-10 NOTE — NURSING NOTE
12/10/20 1215   Wound 12/08/20 2316 Bilateral coccyx   Placement Date/Time: 12/08/20 2316   Present on Hospital Admission: Yes  Side: Bilateral  Location: coccyx   Base black eschar   Periwound intact   Wound Length (cm) 3 cm   Wound Width (cm) 2 cm   Drainage Amount scant   Dressing Care dressing changed  (optifoam)   Wound 12/08/20 2316 Left medial foot   Placement Date/Time: 12/08/20 2316   Present on Hospital Admission: Yes  Side: Left  Orientation: medial  Location: foot  Stage, Pressure Injury : Stage 2   Base maroon/purple   Wound Length (cm) 2.5 cm   Wound Width (cm) 1.5 cm   Drainage Amount none   Dressing Care open to air   Wound 12/08/20 2316 Left anterior ankle   Placement Date/Time: 12/08/20 2316   Present on Hospital Admission: Yes  Side: Left  Orientation: anterior  Location: ankle  Stage, Pressure Injury : Stage 2   Base maroon/purple   Wound Length (cm) 2 cm   Wound Width (cm) 2 cm   Drainage Amount none   Dressing Care open to air   Wound 12/08/20 2316 Left heel   Placement Date/Time: 12/08/20 2316   Present on Hospital Admission: Yes  Side: Left  Location: heel  Stage, Pressure Injury : Stage 3   Base maroon/purple   Wound Length (cm) 3 cm   Wound Width (cm) 3 cm   Drainage Amount none   Dressing Care open to air   Wound 12/08/20 2316 Left lateral foot   Placement Date/Time: 12/08/20 2316   Present on Hospital Admission: Yes  Side: Left  Orientation: lateral  Location: foot  Stage, Pressure Injury : unstageable   Base maroon/purple   Wound Length (cm) 2 cm   Wound Width (cm) 2 cm   Drainage Amount none   Dressing Care open to air   Wound 12/08/20 2316 Right anterior foot Pressure Injury   Placement Date/Time: 12/08/20 2316   Present on Hospital Admission: Yes  Side: Right  Orientation: anterior  Location: foot  Primary Wound Type: Pressure Injury  Stage, Pressure Injury : unstageable   Base maroon/purple  (plantar great toe)   Wound Length (cm) 2 cm   Wound Width (cm) 2 cm   Drainage Amount  none   Dressing Care open to air       [facility] dept - Patient presented to the hospital with multiple darius of DTI noted to feet as noted above, all areas to feet marroon/purplish, skin intact with no drainage, heel boots in use. Recommend treatment with venelex and apply socks, no dressing needed to feet. Coccyx with layer of black eschar, dry with optifoam intact, scant dng. Recommend to apply venelex and cover with optifoam, can peel back optifoam for reapplication of venelex rather than apply new dressing each time venelex applied. Disucssed with nurse to place full bed length waffle cushion to bed and be diligent with turning and positioning for pressure relief. Patient minimal responsiveness and does not initiate any position changes independently.

## 2020-12-10 NOTE — THERAPY EVALUATION
"Patient Name: Ena Lima  : 1940    MRN: [id number]                              Today's Date: 12/10/2020       Admit Date: 2020    Visit Dx:     ICD-10-CM ICD-9-CM   1. COVID-19 virus infection  U07.1 079.89   2. Hypernatremia  E87.0 276.0   3. Urinary tract infection without hematuria, site unspecified  N39.0 599.0   4. Dementia with behavioral disturbance, unspecified dementia type (CMS/HCC)  F03.91 294.21     Patient Active Problem List   Diagnosis   • COVID-19 virus infection   • Acute cystitis without hematuria   • Metabolic encephalopathy   • Dehydration   • Hypernatremia   • Dementia without behavioral disturbance (CMS/HCC)     Past Medical History:   Diagnosis Date   • Arthritis    • Cellulitis    • Dementia (CMS/HCC)      Past Surgical History:   Procedure Laterality Date   • HERNIA REPAIR       General Information     Row Name 12/10/20 0926          Physical Therapy Time and Intention    Document Type  evaluation Pt. admitted with increasing confusion and COVID infection.  Pt. also with a h/o Dementia  -MS     Mode of Treatment  physical therapy;individual therapy  -MS     Row Name 12/10/20 0926          General Information    Patient Profile Reviewed  yes  -MS     Prior Level of Function  -- Pt. mostly non-verbal and does not answer questions; Unable to obtain accurate past functional history.  -MS     Existing Precautions/Restrictions  (S) fall Exit alarm; COVID Isolation; Dec. skin integrity; Per chart review pt. is also very Hard of hearing.  -MS     Barriers to Rehab  cognitive status;hearing deficit  -MS     Row Name 12/10/20 0926          Cognition    Orientation Status (Cognition)  (S) unable/difficult to assess Pt. is mainly non-verbal with exception of smiling and laughing when I called the pt. \"Blue\" which is what she is called per  notes.  -MS       User Key  (r) = Recorded By, (t) = Taken By, (c) = Cosigned By    Initials Name Provider Type    Alphonso Miller, PT " Physical Therapist        Mobility     Row Name 12/10/20 0929          Bed Mobility    Bed Mobility  supine-sit;sit-supine  -MS     Supine-Sit [place] (Bed Mobility)  maximum assist (25% patient effort)  -MS     Sit-Supine [place] (Bed Mobility)  maximum assist (25% patient effort)  -MS     Assistive Device (Bed Mobility)  draw sheet  -MS     Comment (Bed Mobility)  Once sitting EOB, pt. initially resistive but after squaring up on EOB, pt. requires Min. assist x 1 for static sitting balance and Mod. assist x 1 for dynamic sitting balance.  -MS       User Key  (r) = Recorded By, (t) = Taken By, (c) = Cosigned By    Initials Name Provider Type    Alphonso Miller PT Physical Therapist        Obj/Interventions     Row Name 12/10/20 0930          Range of Motion Comprehensive    Comment, General Range of Motion  Unable to accurately assess AROM as pt. would not follow commands for ROM;  Pt. does assist in movement however with AAROM which presented with 25% impairment x 4 extremities  -MS     Row Name 12/10/20 0930          Strength Comprehensive (MMT)    Comment, General Manual Muscle Testing (MMT) Assessment  Unable to accurately assess MMT as pt. does not follow AROM commands.  -MS     Row Name 12/10/20 0930          Motor Skills    Therapeutic Exercise  -- BUE/LE ther. ex. program x 10 reps completed (AAROM);  Ankle pumps, Hip flexion, LAQ's, Shld Flexion  -MS       User Key  (r) = Recorded By, (t) = Taken By, (c) = Cosigned By    Initials Name Provider Type    Alphonso Miller, PT Physical Therapist        Goals/Plan     Row Name 12/10/20 0954          Bed Mobility Goal 1 (PT)    Activity/Assistive Device (Bed Mobility Goal 1, PT)  bed mobility activities, all  -MS     [place] Level/Cues Needed (Bed Mobility Goal 1, PT)  moderate assist (50-74% patient effort)  -MS     Time Frame (Bed Mobility Goal 1, PT)  long term goal (LTG);1 week  -MS     Strategies/Barriers (Bed Mobility Goal 1, PT)   Long term goal # 2 (1 Week);  Dynamic sitting balance: Min. assist x 1.  -MS       User Key  (r) = Recorded By, (t) = Taken By, (c) = Cosigned By    Initials Name Provider Type    MS Narvaez Alphonso COLE, PT Physical Therapist        Clinical Impression     Row Name 12/10/20 0932          Pain    Additional Documentation  Pain Scale: FACES Pre/Post-Treatment (Group)  -MS     Row Name 12/10/20 0932          Pain Scale: FACES Pre/Post-Treatment    Pain: FACES Scale, Pretreatment  0-->no hurt  -MS     Posttreatment Pain Rating  0-->no hurt  -MS     Row Name 12/10/20 0932          Plan of Care Review    Plan of Care Reviewed With  patient  -MS     Row Name 12/10/20 0932          Therapy Assessment/Plan (PT)    Rehab Potential (PT)  fair, will monitor progress closely  -MS     Criteria for Skilled Interventions Met (PT)  skilled treatment is necessary  -MS     Row Name 12/10/20 0932          Positioning and Restraints    Pre-Treatment Position  in bed  -MS     Post Treatment Position  bed  -MS     In Bed  notified nsg;supine;call light within reach;exit alarm on;encouraged to call for assist;L waffle boot;R waffle boot Pillow under Left side to relieve pressure on her back and buttocks;  All lines intact.  -MS       User Key  (r) = Recorded By, (t) = Taken By, (c) = Cosigned By    Initials Name Provider Type    Nereyda Millerzack COLE, PT Physical Therapist        Outcome Measures     Row Name 12/10/20 0953          How much help from another person do you currently need...    Turning from your back to your side while in flat bed without using bedrails?  2  -MS     Moving from lying on back to sitting on the side of a flat bed without bedrails?  2  -MS     Moving to and from a bed to a chair (including a wheelchair)?  1  -MS     Standing up from a chair using your arms (e.g., wheelchair, bedside chair)?  1  -MS     Climbing 3-5 steps with a railing?  1  -MS     To walk in hospital room?  1  -MS     AM-PAC 6 Clicks Score (PT)   8  -MS     Row Name 12/10/20 0935          Functional Assessment    Outcome Measure Options  AM-PAC 6 Clicks Basic Mobility (PT)  -MS       User Key  (r) = Recorded By, (t) = Taken By, (c) = Cosigned By    Initials Name Provider Type    MS Alphonso Narvaez, PT Physical Therapist        Physical Therapy Education                 Title: PT OT SLP Therapies (In Progress)     Topic: Physical Therapy (In Progress)     Point: Mobility training (In Progress)     Learning Progress Summary           Patient Nonacceptance, E,D, NR by MS at 12/10/2020 0935                   Point: Home exercise program (In Progress)     Learning Progress Summary           Patient Nonacceptance, E,D, NR by MS at 12/10/2020 0935                   Point: Body mechanics (In Progress)     Learning Progress Summary           Patient Nonacceptance, E,D, NR by MS at 12/10/2020 0935                   Point: Precautions (In Progress)     Learning Progress Summary           Patient Nonacceptance, E,D, NR by MS at 12/10/2020 0935                               User Key     Initials Effective Dates Name Provider Type Discipline    MS 04/03/18 -  Alphonso Narvaez PT Physical Therapist PT              PT Recommendation and Plan  Planned Therapy Interventions (PT): balance training, bed mobility training, home exercise program, postural re-education, strengthening, patient/family education, transfer training, ROM (range of motion)  Plan of Care Reviewed With: patient  Outcome Summary: Pt. is a 80 year old Female admitted to the hospital [place] with increased confusion and COVID infection.  Pt. does have a h/o dementia per chart review.  Pt. currently presents with decreased strength, decreased ROM, decreased balance, and decreased tolerance to functional activity. Pt. will benefit from skilled inpt. P.T. to address her functional deficits and to assist pt. in regaining her maximum level of independence with functional mobility. Pt.'s progression will  depend on her ability to follow commands and cooperate with P.T.  P.T. prognosis at this time is fair.     Time Calculation:   PT Charges     Row Name 12/10/20 0938             Time Calculation    Start Time  0855  -MS      Stop Time  0920  -MS      Time Calculation (min)  25 min  -MS      PT Received On  12/10/20  -MS      PT - Next Appointment  12/12/20  -MS      PT Goal Re-Cert Due Date  12/17/20  -MS         Time Calculation- PT    Total Timed Code Minutes- PT  24 minute(s)  -MS        User Key  (r) = Recorded By, (t) = Taken By, (c) = Cosigned By    Initials Name Provider Type    Alphonso Miller, PT Physical Therapist        Therapy Charges for Today     Code Description Service Date Service Provider Modifiers Qty    61946555258 HC PT EVAL MOD COMPLEXITY 2 12/10/2020 Alphonso Narvaez, PT GP 1    84776517244 HC PT THER PROC EA 15 MIN 12/10/2020 Alphonso Narvaez, PT GP 1          PT G-Codes  Outcome Measure Options: AM-PAC 6 Clicks Basic Mobility (PT)  AM-PAC 6 Clicks Score (PT): 8    Alphonso Narvaez, PT  12/10/2020

## 2020-12-10 NOTE — PLAN OF CARE
Goal Outcome Evaluation:  Plan of Care Reviewed With: patient  Progress: no change  Outcome Summary: pt had bse done today, pt ate well after that, pt remains on RA, safety maintained, cont to monitor, cl

## 2020-12-10 NOTE — PROGRESS NOTES
"DAILY PROGRESS NOTE  [facility]    Patient Identification:  Name: Ena Lima  Age: 80 y.o.  Sex: female  :  1940  MRN: [id number]         Primary Care Physician: Tania Lam APRN      Subjective  No complaints but she is a very poor historian and communicates very little.    Objective:  General Appearance:  Comfortable, in no acute distress and not in pain (Thin and frail).    Vital signs: (most recent): Blood pressure 98/57, pulse 80, temperature 98 °F (36.7 °C), temperature source Oral, resp. rate 22, height 170.2 cm (67.01\"), weight 53.5 kg (118 lb), SpO2 97 %.    Lungs:  Normal effort and normal respiratory rate.  She is not in respiratory distress.  No stridor.  There are decreased breath sounds.  No rales, wheezes or rhonchi.  (Difficult to get the patient to take a deep breath.)  Heart: Normal rate.  Regular rhythm.    Extremities: There is no dependent edema.    Neurological: (Awake but not oriented.  Reasonable eye contact and smiles.).    Skin:  Warm and dry.                Vital signs in last 24 hours:  Temp:  [97.8 °F (36.6 °C)-99 °F (37.2 °C)] 98 °F (36.7 °C)  Heart Rate:  [79-88] 80  Resp:  [18-22] 22  BP: ()/(57-63) 98/57    Intake/Output:    Intake/Output Summary (Last 24 hours) at 12/10/2020 1434  Last data filed at 12/10/2020 1429  Gross per 24 hour   Intake 2730 ml   Output --   Net 2730 ml         Results from last 7 days   Lab Units 12/10/20  0746 20  0610 20  1934   WBC 10*3/mm3 7.74 10.23 10.69   HEMOGLOBIN g/dL 11.2* 13.2 16.2*   PLATELETS 10*3/mm3 213 254 242     Results from last 7 days   Lab Units 12/10/20  0746 207 20  0610 20  1934   SODIUM mmol/L 151* 153* 158* 158*   POTASSIUM mmol/L 3.9 3.5 3.7 4.5   CHLORIDE mmol/L 120* 118* 122* 119*   CO2 mmol/L 25.6 25.9 26.4 28.3   BUN mg/dL 35* 44* 53* 48*   CREATININE mg/dL 0.79 0.81 0.84 0.99   GLUCOSE mg/dL 107* 166* 130* 101*   Estimated Creatinine Clearance: " 48.2 mL/min (by C-G formula based on SCr of 0.79 mg/dL).  Results from last 7 days   Lab Units 12/10/20  0746 12/09/20  2117 12/09/20  0610 12/08/20  1934   CALCIUM mg/dL 7.8* 8.5* 8.7 9.1   ALBUMIN g/dL 2.10*  --  2.80* 3.10*     Results from last 7 days   Lab Units 12/10/20  0746 12/09/20  0610 12/08/20  1934   ALBUMIN g/dL 2.10* 2.80* 3.10*   BILIRUBIN mg/dL 0.2 0.3 0.5   ALK PHOS U/L 75 72 85   AST (SGOT) U/L 156* 61* 83*   ALT (SGPT) U/L 102* 59* 75*       Assessment:  Hypernatremia:  Continue slow correction with D5.  Monitor very closely.    Dehydration: Slowly correcting with free fluids as noted above.    Acute cystitis without hematuria:  Urine culture with E. coli.  Sensitivities noted.  Can switch to p.o. medications.    COVID-19 virus infection: Remained stable with good room air O2 sats.  Inflammatory parameters elevated but improving.  So far no clinically significant signs of disease.  Continue to monitor closely.      Metabolic encephalopathy:  Multifactorial including severe dehydration, hypernatremia, UTI and possibly COVID-19.  Appears to be improved.  Continue to treat underlying causes.       Dementia without behavioral disturbance    Elevated LFTs this morning: Monitor closely.  Further work-up if any increase in LFTs or not with quick improvement.    Hypotension: Worsened this a.m.  Responded well to conservative normal saline fluid bolus.  Likely associated hypervolemia.    Plan:  Please see above.  Discussed with RN.    Patient was wearing facemask when I entered the room and throughout our encounter.  I wore protective equipment throughout this patient encounter including a face mask and gloves.  Hand hygiene was performed before donning protective equipment and after removal when leaving the room.    Betito Middleton MD  12/10/2020  14:34 EST    Addendum:  Blood pressure improved with conservative normal saline bolus.  Serum sodium level however going back up.  Creatinine normal and  BUN much improved.  We will switch IV fluids to D5W at 40 cc an hour.  Check urine sodium level.  Continue to monitor very closely.  Electronically signed by Betito Middleton MD, 12/10/20, 7:09 PM EST.

## 2020-12-11 LAB
ALBUMIN SERPL-MCNC: 2.2 G/DL (ref 3.5–5.2)
ALBUMIN/GLOB SERPL: 0.6 G/DL
ALP SERPL-CCNC: 72 U/L (ref 39–117)
ALT SERPL W P-5'-P-CCNC: 76 U/L (ref 1–33)
ANION GAP SERPL CALCULATED.3IONS-SCNC: 4.1 MMOL/L (ref 5–15)
ANION GAP SERPL CALCULATED.3IONS-SCNC: 4.5 MMOL/L (ref 5–15)
AST SERPL-CCNC: 72 U/L (ref 1–32)
BASOPHILS # BLD AUTO: 0.03 10*3/MM3 (ref 0–0.2)
BASOPHILS NFR BLD AUTO: 0.4 % (ref 0–1.5)
BILIRUB SERPL-MCNC: 0.3 MG/DL (ref 0–1.2)
BUN SERPL-MCNC: 21 MG/DL (ref 8–23)
BUN SERPL-MCNC: 22 MG/DL (ref 8–23)
BUN/CREAT SERPL: 36.7 (ref 7–25)
BUN/CREAT SERPL: 37.5 (ref 7–25)
CALCIUM SPEC-SCNC: 8.1 MG/DL (ref 8.6–10.5)
CALCIUM SPEC-SCNC: 8.1 MG/DL (ref 8.6–10.5)
CHLORIDE SERPL-SCNC: 113 MMOL/L (ref 98–107)
CHLORIDE SERPL-SCNC: 120 MMOL/L (ref 98–107)
CK SERPL-CCNC: 97 U/L (ref 20–180)
CO2 SERPL-SCNC: 26.5 MMOL/L (ref 22–29)
CO2 SERPL-SCNC: 27.9 MMOL/L (ref 22–29)
CREAT SERPL-MCNC: 0.56 MG/DL (ref 0.57–1)
CREAT SERPL-MCNC: 0.6 MG/DL (ref 0.57–1)
CRP SERPL-MCNC: 3.86 MG/DL (ref 0–0.5)
D DIMER PPP FEU-MCNC: 1.32 MCGFEU/ML (ref 0–0.49)
DEPRECATED RDW RBC AUTO: 42.4 FL (ref 37–54)
EOSINOPHIL # BLD AUTO: 0.18 10*3/MM3 (ref 0–0.4)
EOSINOPHIL NFR BLD AUTO: 2.4 % (ref 0.3–6.2)
ERYTHROCYTE [DISTWIDTH] IN BLOOD BY AUTOMATED COUNT: 13 % (ref 12.3–15.4)
FERRITIN SERPL-MCNC: 448 NG/ML (ref 13–150)
FIBRINOGEN PPP-MCNC: 680 MG/DL (ref 219–464)
GFR SERPL CREATININE-BSD FRML MDRD: 104 ML/MIN/1.73
GFR SERPL CREATININE-BSD FRML MDRD: 96 ML/MIN/1.73
GLOBULIN UR ELPH-MCNC: 3.8 GM/DL
GLUCOSE SERPL-MCNC: 101 MG/DL (ref 65–99)
GLUCOSE SERPL-MCNC: 93 MG/DL (ref 65–99)
HCT VFR BLD AUTO: 34.5 % (ref 34–46.6)
HGB BLD-MCNC: 11.2 G/DL (ref 12–15.9)
IMM GRANULOCYTES # BLD AUTO: 0.2 10*3/MM3 (ref 0–0.05)
IMM GRANULOCYTES NFR BLD AUTO: 2.6 % (ref 0–0.5)
LDH SERPL-CCNC: 217 U/L (ref 135–214)
LYMPHOCYTES # BLD AUTO: 1.9 10*3/MM3 (ref 0.7–3.1)
LYMPHOCYTES NFR BLD AUTO: 24.9 % (ref 19.6–45.3)
MCH RBC QN AUTO: 28.9 PG (ref 26.6–33)
MCHC RBC AUTO-ENTMCNC: 32.5 G/DL (ref 31.5–35.7)
MCV RBC AUTO: 88.9 FL (ref 79–97)
MONOCYTES # BLD AUTO: 0.69 10*3/MM3 (ref 0.1–0.9)
MONOCYTES NFR BLD AUTO: 9 % (ref 5–12)
NEUTROPHILS NFR BLD AUTO: 4.63 10*3/MM3 (ref 1.7–7)
NEUTROPHILS NFR BLD AUTO: 60.7 % (ref 42.7–76)
NRBC BLD AUTO-RTO: 0.1 /100 WBC (ref 0–0.2)
PLATELET # BLD AUTO: 213 10*3/MM3 (ref 140–450)
PMV BLD AUTO: 11.2 FL (ref 6–12)
POTASSIUM SERPL-SCNC: 4 MMOL/L (ref 3.5–5.2)
POTASSIUM SERPL-SCNC: 4.1 MMOL/L (ref 3.5–5.2)
PROT SERPL-MCNC: 6 G/DL (ref 6–8.5)
RBC # BLD AUTO: 3.88 10*6/MM3 (ref 3.77–5.28)
SODIUM SERPL-SCNC: 145 MMOL/L (ref 136–145)
SODIUM SERPL-SCNC: 151 MMOL/L (ref 136–145)
WBC # BLD AUTO: 7.63 10*3/MM3 (ref 3.4–10.8)

## 2020-12-11 PROCEDURE — 85025 COMPLETE CBC W/AUTO DIFF WBC: CPT | Performed by: HOSPITALIST

## 2020-12-11 PROCEDURE — 80053 COMPREHEN METABOLIC PANEL: CPT | Performed by: HOSPITALIST

## 2020-12-11 PROCEDURE — 82550 ASSAY OF CK (CPK): CPT | Performed by: HOSPITALIST

## 2020-12-11 PROCEDURE — 83615 LACTATE (LD) (LDH) ENZYME: CPT | Performed by: HOSPITALIST

## 2020-12-11 PROCEDURE — 86140 C-REACTIVE PROTEIN: CPT | Performed by: HOSPITALIST

## 2020-12-11 PROCEDURE — 97110 THERAPEUTIC EXERCISES: CPT

## 2020-12-11 PROCEDURE — 25010000002 ENOXAPARIN PER 10 MG: Performed by: HOSPITALIST

## 2020-12-11 PROCEDURE — 85379 FIBRIN DEGRADATION QUANT: CPT | Performed by: HOSPITALIST

## 2020-12-11 PROCEDURE — 85384 FIBRINOGEN ACTIVITY: CPT | Performed by: HOSPITALIST

## 2020-12-11 PROCEDURE — 82728 ASSAY OF FERRITIN: CPT | Performed by: HOSPITALIST

## 2020-12-11 RX ADMIN — CASTOR OIL AND BALSAM, PERU 5 G: 788; 87 OINTMENT TOPICAL at 20:36

## 2020-12-11 RX ADMIN — OLANZAPINE 10 MG: 10 TABLET ORAL at 20:36

## 2020-12-11 RX ADMIN — FOLIC ACID 1 MG: 1 TABLET ORAL at 10:03

## 2020-12-11 RX ADMIN — MIRTAZAPINE 7.5 MG: 15 TABLET, FILM COATED ORAL at 20:36

## 2020-12-11 RX ADMIN — CASTOR OIL AND BALSAM, PERU 5 G: 788; 87 OINTMENT TOPICAL at 10:03

## 2020-12-11 RX ADMIN — CEFDINIR 300 MG: 300 CAPSULE ORAL at 20:36

## 2020-12-11 RX ADMIN — ENOXAPARIN SODIUM 40 MG: 40 INJECTION SUBCUTANEOUS at 10:03

## 2020-12-11 RX ADMIN — Medication 1000 MCG: at 10:03

## 2020-12-11 RX ADMIN — CEFDINIR 300 MG: 300 CAPSULE ORAL at 10:03

## 2020-12-11 RX ADMIN — DIVALPROEX SODIUM 250 MG: 250 TABLET, DELAYED RELEASE ORAL at 20:36

## 2020-12-11 NOTE — PLAN OF CARE
Goal Outcome Evaluation:  Plan of Care Reviewed With: patient  Progress: no change  Outcome Summary: Pt. requires Dep. assist x 1 for bed mobility this date.  BUE/LE ther. ex. program (AA/PROM) x 10 reps completed for general strengthening. Pt. would not open her eyes this PM during therapy session and is non-verbal.    Patient was not wearing a face mask during this therapy encounter. Therapist used appropriate personal protective equipment including eye protection, mask, and gloves.  Mask used was standard procedure mask. Appropriate PPE was worn during the entire therapy session. Hand hygiene was completed before and after therapy session. Patient is in enhanced droplet precautions.

## 2020-12-11 NOTE — PLAN OF CARE
Problem: Skin Injury Risk Increased  Goal: Skin Health and Integrity  Outcome: Ongoing, Progressing  Intervention: Optimize Skin Protection  Recent Flowsheet Documentation  Taken 12/11/2020 0435 by Loren Ramirez RN  Head of Bed (HOB): HOB elevated  Taken 12/11/2020 0303 by Loren Ramirez RN  Pressure Reduction Techniques:   frequent weight shift encouraged   heels elevated off bed  Head of Bed (HOB): HOB elevated  Pressure Reduction Devices: (waffle bed cushion applied under bed)   alternating pressure pump (ADD)   specialty bed utilized   other (see comments)  Skin Protection: adhesive use limited  Taken 12/11/2020 0132 by Lroen Ramirez RN  Head of Bed (HOB): HOB elevated  Taken 12/10/2020 2352 by Loren Ramirez RN  Head of Bed (HOB): HOB elevated  Taken 12/10/2020 2132 by Loren Ramirez RN  Pressure Reduction Techniques: (waffle boot)   frequent weight shift encouraged   heels elevated off bed  Head of Bed (HOB): HOB elevated  Pressure Reduction Devices:   alternating pressure pump (ADD)   pressure-redistributing mattress utilized  Skin Protection: adhesive use limited     Problem: Fall Injury Risk  Goal: Absence of Fall and Fall-Related Injury  Outcome: Ongoing, Progressing  Intervention: Promote Injury-Free Environment  Recent Flowsheet Documentation  Taken 12/11/2020 0435 by Loren Ramirez RN  Safety Promotion/Fall Prevention:   safety round/check completed   room organization consistent   nonskid shoes/slippers when out of bed  Taken 12/11/2020 0303 by Loren Ramirez RN  Safety Promotion/Fall Prevention:   room organization consistent   safety round/check completed   nonskid shoes/slippers when out of bed  Taken 12/11/2020 0132 by Loren Ramirez RN  Safety Promotion/Fall Prevention:   safety round/check completed   room organization consistent   nonskid shoes/slippers when out of bed  Taken 12/10/2020 2352 by Loren Ramirez RN  Safety Promotion/Fall  Prevention:   safety round/check completed   room organization consistent   nonskid shoes/slippers when out of bed  Taken 12/10/2020 2132 by Loren Ramirez RN  Safety Promotion/Fall Prevention:   safety round/check completed   room organization consistent   nonskid shoes/slippers when out of bed   Goal Outcome Evaluation:  Plan of Care Reviewed With: patient  Progress: no change  Outcome Summary: VSS; IV fluids were changed to D5W @40cc/hr;Pt will wake up and take PO pills with applesauce; Pt was I&O cathed and got 500cc output; Urine sodium done; Waffle bed cushion placed under pt per wound care order; Will continue to monitor

## 2020-12-11 NOTE — PROGRESS NOTES
"DAILY PROGRESS NOTE  [facility]    Patient Identification:  Name: Ena Lima  Age: 80 y.o.  Sex: female  :  1940  MRN: [id number]         Primary Care Physician: Tania Lam APRN      Subjective  Patient voices no complaints but has minimal communication.    Objective:  General Appearance:  Comfortable, in no acute distress and not in pain (Thin and frail).    Vital signs: (most recent): Blood pressure 112/74, pulse 71, temperature 98.7 °F (37.1 °C), temperature source Oral, resp. rate 20, height 170.2 cm (67.01\"), weight 53.5 kg (118 lb), SpO2 99 %.    Lungs:  Normal effort and normal respiratory rate.  Breath sounds clear to auscultation.    Heart: Normal rate.  Regular rhythm.    Extremities: There is no dependent edema.    Neurological: (Awake.  Basically stares at the TV through most all the exam.  Speaks a few words but are difficult to understand.  Nurses aide is feeding her and she is cooperating very nicely with her.).    Skin:  Warm and dry.                Vital signs in last 24 hours:  Temp:  [97.8 °F (36.6 °C)-99.2 °F (37.3 °C)] 98.7 °F (37.1 °C)  Heart Rate:  [71-77] 71  Resp:  [18-22] 20  BP: ()/(54-81) 112/74    Intake/Output:    Intake/Output Summary (Last 24 hours) at 2020 1748  Last data filed at 2020 1220  Gross per 24 hour   Intake 1380 ml   Output 650 ml   Net 730 ml         Results from last 7 days   Lab Units 20  0549 12/10/20  0746 20  0610 20  1934   WBC 10*3/mm3 7.63 7.74 10.23 10.69   HEMOGLOBIN g/dL 11.2* 11.2* 13.2 16.2*   PLATELETS 10*3/mm3 213 213 254 242     Results from last 7 days   Lab Units 20  0549 12/10/20  1706 12/10/20  0746 207 20  0610 20  1934   SODIUM mmol/L 151* 155* 151* 153* 158* 158*   POTASSIUM mmol/L 4.1 3.9 3.9 3.5 3.7 4.5   CHLORIDE mmol/L 120* 122* 120* 118* 122* 119*   CO2 mmol/L 26.5 27.4 25.6 25.9 26.4 28.3   BUN mg/dL 21 29* 35* 44* 53* 48*   CREATININE " mg/dL 0.56* 0.74 0.79 0.81 0.84 0.99   GLUCOSE mg/dL 93 84 107* 166* 130* 101*   Estimated Creatinine Clearance: 48.2 mL/min (A) (by C-G formula based on SCr of 0.56 mg/dL (L)).  Results from last 7 days   Lab Units 12/11/20  0549 12/10/20  1706 12/10/20  0746 12/09/20  2117 12/09/20  0610 12/08/20  1934   CALCIUM mg/dL 8.1* 8.2* 7.8* 8.5* 8.7 9.1   ALBUMIN g/dL 2.20*  --  2.10*  --  2.80* 3.10*     Results from last 7 days   Lab Units 12/11/20  0549 12/10/20  0746 12/09/20  0610 12/08/20  1934   ALBUMIN g/dL 2.20* 2.10* 2.80* 3.10*   BILIRUBIN mg/dL 0.3 0.2 0.3 0.5   ALK PHOS U/L 72 75 72 85   AST (SGOT) U/L 72* 156* 61* 83*   ALT (SGPT) U/L 76* 102* 59* 75*       Assessment:  Hypernatremia:   Continues to improve with hypotonic IV solutions.  Continue to monitor closely.     Dehydration: Resolved.  BUN/creatinine now normal.  Will  Slow down and IV fluids    Acute cystitis without hematuria:  Urine culture with E. coli.  Sensitivities noted.    Continue with p.o. antibiotics.     COVID-19 virus infection: Remained stable with good room air O2 sats.  Inflammatory parameters elevated but improving.  So far no clinically significant signs of disease.  Continue to monitor closely.      Metabolic encephalopathy:  Multifactorial including severe dehydration, hypernatremia, UTI and possibly COVID-19.    I suspect we are now seeing her at her baseline.       Dementia without behavioral disturbance     Elevated LFTs this morning: Improving.  Continue to monitor.    Hypotension: Much improved with hydration.  Continue to monitor.    Plan:  Please see above.      Betito Middleton MD  12/11/2020  17:48 EST

## 2020-12-11 NOTE — PLAN OF CARE
Goal Outcome Evaluation:  Plan of Care Reviewed With: daughter  Progress: no change  Outcome Summary: Pt's BP low this morning, 500cc bolus given, pt very lethargic but did perk up after bolus, ate over 50% of her lunch and dinner, good UOP, NA back up to 155 from 151, dr ricci aware. pt safety maintained, will cont to monitor CL

## 2020-12-11 NOTE — THERAPY TREATMENT NOTE
Patient Name: Ena Lima  : 1940    MRN: [id number]                              Today's Date: 2020       Admit Date: 2020    Visit Dx:     ICD-10-CM ICD-9-CM   1. COVID-19 virus infection  U07.1 079.89   2. Hypernatremia  E87.0 276.0   3. Urinary tract infection without hematuria, site unspecified  N39.0 599.0   4. Dementia with behavioral disturbance, unspecified dementia type (CMS/HCC)  F03.91 294.21     Patient Active Problem List   Diagnosis   • COVID-19 virus infection   • Acute cystitis without hematuria   • Metabolic encephalopathy   • Dehydration   • Hypernatremia   • Dementia without behavioral disturbance (CMS/HCC)     Past Medical History:   Diagnosis Date   • Arthritis    • Cellulitis    • Dementia (CMS/HCC)      Past Surgical History:   Procedure Laterality Date   • HERNIA REPAIR       General Information     Row Name 20 8136          Physical Therapy Time and Intention    Document Type  therapy note (daily note) Pt. is non-verbal and not opening her eyes this date.  -MS     Mode of Treatment  physical therapy;individual therapy  -MS     Row Name 20 2690          General Information    Patient Profile Reviewed  yes  -MS     Existing Precautions/Restrictions  (S) fall Exit alarm; Dec. skin integrity; COVID Isolation  -MS       User Key  (r) = Recorded By, (t) = Taken By, (c) = Cosigned By    Initials Name Provider Type    Alphonso Miller, PT Physical Therapist        Mobility     Row Name 20 1600          Bed Mobility    Supine-Sit Green (Bed Mobility)  dependent (less than 25% patient effort)  -MS     Sit-Supine Green (Bed Mobility)  dependent (less than 25% patient effort)  -MS     Assistive Device (Bed Mobility)  draw sheet  -MS       User Key  (r) = Recorded By, (t) = Taken By, (c) = Cosigned By    Initials Name Provider Type    Alphonso Miller PT Physical Therapist        Obj/Interventions     Row Name 20 1600          Motor Skills     Therapeutic Exercise  -- BUE/LE ther. ex. program (AA/PROM) x 10 reps completed (Ankle Pumps, Heel slides, Hip Abduction, SLR's, Shld Flexion)  -MS       User Key  (r) = Recorded By, (t) = Taken By, (c) = Cosigned By    Initials Name Provider Type    Alphonso Miller PT Physical Therapist        Goals/Plan    No documentation.       Clinical Impression     Row Name 12/11/20 1601          Pain Scale: FACES Pre/Post-Treatment    Pain: FACES Scale, Pretreatment  0-->no hurt  -MS     Posttreatment Pain Rating  0-->no hurt  -MS     Row Name 12/11/20 1601          Positioning and Restraints    Pre-Treatment Position  in bed  -MS     Post Treatment Position  bed  -MS     In Bed  notified nsg;supine;call light within reach;encouraged to call for assist;exit alarm on;R heel elevated;L heel elevated All lines intact.  -MS       User Key  (r) = Recorded By, (t) = Taken By, (c) = Cosigned By    Initials Name Provider Type    Alphonso Miller, PT Physical Therapist        Outcome Measures     Row Name 12/11/20 1601          How much help from another person do you currently need...    Turning from your back to your side while in flat bed without using bedrails?  1  -MS     Moving from lying on back to sitting on the side of a flat bed without bedrails?  1  -MS     Moving to and from a bed to a chair (including a wheelchair)?  1  -MS     Standing up from a chair using your arms (e.g., wheelchair, bedside chair)?  1  -MS     Climbing 3-5 steps with a railing?  1  -MS     To walk in hospital room?  1  -MS     AM-PAC 6 Clicks Score (PT)  6  -MS       User Key  (r) = Recorded By, (t) = Taken By, (c) = Cosigned By    Initials Name Provider Type    Alphonso Miller PT Physical Therapist        Physical Therapy Education                 Title: PT OT SLP Therapies (In Progress)     Topic: Physical Therapy (Done)     Point: Mobility training (Done)     Learning Progress Summary           Patient Acceptance, E,D, VU,NR by  MS at 12/11/2020 1602    Nonacceptance, E,D, NR by MS at 12/10/2020 0935                   Point: Home exercise program (Done)     Learning Progress Summary           Patient Acceptance, E,D, VU,NR by MS at 12/11/2020 1602    Nonacceptance, E,D, NR by MS at 12/10/2020 0935                   Point: Body mechanics (Done)     Learning Progress Summary           Patient Acceptance, E,D, VU,NR by MS at 12/11/2020 1602    Nonacceptance, E,D, NR by MS at 12/10/2020 0935                   Point: Precautions (Done)     Learning Progress Summary           Patient Acceptance, E,D, VU,NR by MS at 12/11/2020 1602    Nonacceptance, E,D, NR by MS at 12/10/2020 0935                               User Key     Initials Effective Dates Name Provider Type Discipline    MS 04/03/18 -  Alphonso Narvaez, PT Physical Therapist PT              PT Recommendation and Plan  Planned Therapy Interventions (PT): balance training, bed mobility training, home exercise program, postural re-education, strengthening, patient/family education, transfer training, ROM (range of motion)  Plan of Care Reviewed With: patient  Outcome Summary: Pt. requires Dep. assist x 1 for bed mobility this date.  BUE/LE ther. ex. program (AA/PROM) x 10 reps completed for general strengthening. Pt. would not open her eyes this PM during therapy session and is non-verbal.     Time Calculation:   PT Charges     Row Name 12/11/20 1603             Time Calculation    Start Time  1310  -MS      Stop Time  1327  -MS      Time Calculation (min)  17 min  -MS      PT Received On  12/11/20  -MS      PT - Next Appointment  12/14/20  -MS         Time Calculation- PT    Total Timed Code Minutes- PT  16 minute(s)  -MS        User Key  (r) = Recorded By, (t) = Taken By, (c) = Cosigned By    Initials Name Provider Type    MS Alphonso Narvaez, PT Physical Therapist        Therapy Charges for Today     Code Description Service Date Service Provider Modifiers Qty    67103127100  PT EVAL  MOD COMPLEXITY 2 12/10/2020 Alphonso Narvaez, PT GP 1    79583033614 HC PT THER PROC EA 15 MIN 12/10/2020 Alphonso Narvaez, PT GP 1    12845887507 HC PT THER PROC EA 15 MIN 12/11/2020 Alphonso Narvaez, PT GP 1          PT G-Codes  Outcome Measure Options: AM-PAC 6 Clicks Basic Mobility (PT)  AM-PAC 6 Clicks Score (PT): 6    Alphonso Narvaez, PT  12/11/2020

## 2020-12-11 NOTE — CONSULTS
Palliative Care spoke with her daughter Antonia by phone again. States she feels pretty comfortable with the plan for DC to [facility] if possible to allow her to continue to recover/rehab there. She is not sure she will be able to bring her home again, but would be open to that.     Appreciative of information and calls. We will sign off.

## 2020-12-12 VITALS
SYSTOLIC BLOOD PRESSURE: 110 MMHG | OXYGEN SATURATION: 97 % | BODY MASS INDEX: 18.52 KG/M2 | RESPIRATION RATE: 14 BRPM | DIASTOLIC BLOOD PRESSURE: 67 MMHG | WEIGHT: 118 LBS | TEMPERATURE: 98.8 F | HEART RATE: 71 BPM | HEIGHT: 67 IN

## 2020-12-12 LAB
ALBUMIN SERPL-MCNC: 2.4 G/DL (ref 3.5–5.2)
ALBUMIN/GLOB SERPL: 0.6 G/DL
ALP SERPL-CCNC: 74 U/L (ref 39–117)
ALT SERPL W P-5'-P-CCNC: 55 U/L (ref 1–33)
ANION GAP SERPL CALCULATED.3IONS-SCNC: 4.9 MMOL/L (ref 5–15)
AST SERPL-CCNC: 41 U/L (ref 1–32)
BILIRUB SERPL-MCNC: 0.3 MG/DL (ref 0–1.2)
BUN SERPL-MCNC: 18 MG/DL (ref 8–23)
BUN/CREAT SERPL: 29.5 (ref 7–25)
CALCIUM SPEC-SCNC: 8.2 MG/DL (ref 8.6–10.5)
CHLORIDE SERPL-SCNC: 112 MMOL/L (ref 98–107)
CK SERPL-CCNC: 60 U/L (ref 20–180)
CO2 SERPL-SCNC: 26.1 MMOL/L (ref 22–29)
CREAT SERPL-MCNC: 0.61 MG/DL (ref 0.57–1)
CRP SERPL-MCNC: 2.9 MG/DL (ref 0–0.5)
DEPRECATED RDW RBC AUTO: 41.4 FL (ref 37–54)
ERYTHROCYTE [DISTWIDTH] IN BLOOD BY AUTOMATED COUNT: 12.6 % (ref 12.3–15.4)
FERRITIN SERPL-MCNC: 399 NG/ML (ref 13–150)
GFR SERPL CREATININE-BSD FRML MDRD: 95 ML/MIN/1.73
GLOBULIN UR ELPH-MCNC: 3.8 GM/DL
GLUCOSE SERPL-MCNC: 82 MG/DL (ref 65–99)
HCT VFR BLD AUTO: 36.5 % (ref 34–46.6)
HGB BLD-MCNC: 11.6 G/DL (ref 12–15.9)
LYMPHOCYTES # BLD MANUAL: 1.45 10*3/MM3 (ref 0.7–3.1)
LYMPHOCYTES NFR BLD MANUAL: 18.2 % (ref 19.6–45.3)
LYMPHOCYTES NFR BLD MANUAL: 2 % (ref 5–12)
MCH RBC QN AUTO: 28.6 PG (ref 26.6–33)
MCHC RBC AUTO-ENTMCNC: 31.8 G/DL (ref 31.5–35.7)
MCV RBC AUTO: 90.1 FL (ref 79–97)
METAMYELOCYTES NFR BLD MANUAL: 1 % (ref 0–0)
MONOCYTES # BLD AUTO: 0.16 10*3/MM3 (ref 0.1–0.9)
MYELOCYTES NFR BLD MANUAL: 3 % (ref 0–0)
NEUTROPHILS # BLD AUTO: 6.03 10*3/MM3 (ref 1.7–7)
NEUTROPHILS NFR BLD MANUAL: 75.8 % (ref 42.7–76)
PLAT MORPH BLD: NORMAL
PLATELET # BLD AUTO: 210 10*3/MM3 (ref 140–450)
PMV BLD AUTO: 10.9 FL (ref 6–12)
POTASSIUM SERPL-SCNC: 4.2 MMOL/L (ref 3.5–5.2)
PROT SERPL-MCNC: 6.2 G/DL (ref 6–8.5)
RBC # BLD AUTO: 4.05 10*6/MM3 (ref 3.77–5.28)
RBC MORPH BLD: NORMAL
SODIUM SERPL-SCNC: 143 MMOL/L (ref 136–145)
WBC # BLD AUTO: 7.95 10*3/MM3 (ref 3.4–10.8)
WBC MORPH BLD: NORMAL

## 2020-12-12 PROCEDURE — 25010000002 ENOXAPARIN PER 10 MG: Performed by: HOSPITALIST

## 2020-12-12 PROCEDURE — 82550 ASSAY OF CK (CPK): CPT | Performed by: HOSPITALIST

## 2020-12-12 PROCEDURE — 80053 COMPREHEN METABOLIC PANEL: CPT | Performed by: HOSPITALIST

## 2020-12-12 PROCEDURE — 85007 BL SMEAR W/DIFF WBC COUNT: CPT | Performed by: HOSPITALIST

## 2020-12-12 PROCEDURE — 85025 COMPLETE CBC W/AUTO DIFF WBC: CPT | Performed by: HOSPITALIST

## 2020-12-12 PROCEDURE — 86140 C-REACTIVE PROTEIN: CPT | Performed by: HOSPITALIST

## 2020-12-12 PROCEDURE — 82728 ASSAY OF FERRITIN: CPT | Performed by: HOSPITALIST

## 2020-12-12 RX ADMIN — CEFDINIR 300 MG: 300 CAPSULE ORAL at 11:04

## 2020-12-12 RX ADMIN — Medication 1000 MCG: at 11:04

## 2020-12-12 RX ADMIN — CASTOR OIL AND BALSAM, PERU 5 G: 788; 87 OINTMENT TOPICAL at 11:05

## 2020-12-12 RX ADMIN — FOLIC ACID 1 MG: 1 TABLET ORAL at 11:05

## 2020-12-12 RX ADMIN — ENOXAPARIN SODIUM 40 MG: 40 INJECTION SUBCUTANEOUS at 11:05

## 2020-12-12 NOTE — DISCHARGE SUMMARY
PHYSICIAN DISCHARGE SUMMARY                                                                        [facility]    Patient Identification:  Name: Ena Lima  Age: 80 y.o.  Sex: female  :  1940  MRN: [id number]  Primary Care Physician: Tania Lam APRN    Admit date: 2020  Discharge date and time: 2020     Discharged Condition: good    Discharge Diagnoses:  Hypernatremia: Secondary to severe dehydration.  Resolved.    Dehydration:  Secondary to poor oral intake and diuretics.  Resolved.  Hypotension: Secondary to dehydration.  Resolved.  Acute cystitis without hematuria:     COVID-19 virus infection: Clinically asymptomatic.     Metabolic encephalopathy:      Dementia without behavioral disturbance  Elevated LFTs:  Improving.      Hospital Course:  Pleasant 80-year-old female with advanced dementia is transferred this facility due to increasing confusion.  Please H&P for full details.  On presentation she is severely dehydrated with a marked hypernatremia and elevated BUN and creatinine.  She also tested positive for COVID-19 but she was afebrile, chest x-ray with no infiltrates, good room air O2 sats as far as we can tell is asymptomatic throughout hospitalization for this.  We did follow inflammatory parameters which have been improving nicely.  Concerning dehydration.  She was hydrated initially aggressively emergency room then put on half-normal saline and sodium levels followed closely.  The did start to creep back up again transiently and her IV fluids switched over to D5 W for a while and then back to half-normal saline.  Sodium levels have now normalized.  In addition her BUN/creatinine are now normal.  With hydration she did perk up.  Baseline is not known but I suspect at this point she is back to baseline.  Noted that there was a transient bump in her transaminase levels which is also improving  on a daily basis.  At this point then she will be discharge remainder treatment follow back to the memory care unit.  I would advise that her Lasix be kept on hold and that she have assistance with each meal.      Consults:     Consults     Date and Time Order Name Status Description    12/8/2020 2050 MARIVEL (on-call MD unless specified) Details Completed             Discharge Exam:  Well-developed but very thin and frail female in no apparent distress.    Lungs are clear to auscultation with good air movement.  Heart regular rate and rhythm.  Abdomen benign.  Extremities no clubbing cyanosis edema.  She is asleep on entering the room but awakens very easily.  Has very reasonable eye contact but communicates very little.  She is cooperative with exam.    Patient was wearing facemask when I entered the room and throughout our encounter.  I wore protective equipment throughout this patient encounter including a face mask and gloves.  Hand hygiene was performed before donning protective equipment and after removal when leaving the room.     Disposition:  Skilled nursing facility    Patient Instructions:      Discharge Medications      Continue These Medications      Instructions Start Date   acetaminophen 500 MG tablet  Commonly known as: TYLENOL   1,000 mg, Oral, 3 Times Daily PRN      divalproex 250 MG DR tablet  Commonly known as: DEPAKOTE   250 mg, Oral, Every Night at Bedtime      folic acid 1 MG tablet  Commonly known as: FOLVITE   1 mg, Oral, Daily      mirtazapine 15 MG tablet  Commonly known as: REMERON   7.5 mg, Oral, Nightly      OLANZapine 10 MG tablet  Commonly known as: zyPREXA   10 mg, Oral, Every Night at Bedtime      vitamin B-12 1000 MCG tablet  Commonly known as: CYANOCOBALAMIN   1,000 mcg, Oral, Daily      zinc oxide 20 % ointment   Topical, 2 Times Daily, To coccyx          Stop These Medications    fexofenadine 180 MG tablet  Commonly known as: ALLEGRA     furosemide 20 MG tablet  Commonly known as:  LASIX     potassium chloride 10 MEQ CR capsule  Commonly known as: MICRO-K          Diet Instructions     Diet: Dysphagia; Thin Liquids, No Restrictions; Pureed      Discharge Diet: Dysphagia    Fluid Consistency: Thin Liquids, No Restrictions    Pureed Options: Pureed        No future appointments.  Additional Instructions for the Follow-ups that You Need to Schedule     Discharge Follow-up with PCP   As directed       Currently Documented PCP:    Tania Lam APRN    PCP Phone Number:    [phone]     Follow Up Details: 1 week           Follow-up Information     Tania Lam APRN .    Specialty: Family Medicine  Why: 1 week  Contact information:  [facility] [place]note 2[facility] [address]  [address]  [place] [address]  [phone]                 Discharge Order (From admission, onward)     Start     Ordered    12/12/20 1349  Discharge patient  Once     Expected Discharge Date: 12/12/20    Discharge Disposition: Skilled Nursing Facility (DC - External)    Physician of Record for Attribution - Please select from Treatment Team: BETITO MIDDLETON [[id number]]    Review needed by CMO to determine Physician of Record: No       Question Answer Comment   Physician of Record for Attribution - Please select from Treatment Team BETITO MIDDLETON    Review needed by CMO to determine Physician of Record No        12/12/20 1351                  Total time spent discharging patient including evaluation,post hospitalization follow up,  medication and post hospitalization instructions and education total time exceeds 30 minutes.    Signed:  Betito Middleton MD  12/12/2020  13:51 EST    EMR Dragon/Transcription disclaimer:   Much of this encounter note is an electronic transcription/translation of spoken language to printed text. The electronic translation of spoken language may permit erroneous, or at times, nonsensical words or phrases to be inadvertently transcribed; Although I have reviewed the note for  such errors, some may still exist.

## 2020-12-12 NOTE — PLAN OF CARE
Goal Outcome Evaluation:  Plan of Care Reviewed With: patient  Progress: no change  Outcome Summary: Patients more alert today, appetite has improved.  Patients NA is WDL.  Continue wound care, continue q2 turn.  Continue PO antbx.  Mary updated this am.  CTM closey.  Possible DC in tomorrow

## 2020-12-12 NOTE — PLAN OF CARE
Goal Outcome Evaluation:  Plan of Care Reviewed With: patient, daughter  Progress: improving  Outcome Summary: Pt being sent back to memory care unit per Dr. Middleton, IV and heart monitor removed. Pt to be taken to lobby and picked up by daughter Antonia who will be transferring her back to Memory care unit  CL

## 2020-12-12 NOTE — PLAN OF CARE
Goal Outcome Evaluation:  Plan of Care Reviewed With: patient  Progress: no change   Pt remains on room air. No signs of pain noted. Continued skin and wound care. Turned q 2. Pt ate evening snack of applesauce. Continue to monitor. Possible discharge today to NH. Daughter updated at start of shift.

## 2020-12-14 NOTE — PAYOR COMM NOTE
"Ena Lima (80 y.o. Female)     PLEASE SEE ATTACHED DC SUMMARY    REF#[id number]    THANK YOU    JENNIFER TREVIZO LPN CCP    Date of Birth Social Security Number Address Home Phone MRN    1940  [id number] [facility] [address] [phone] [id number]    [employer] Mar[employer] Status          Yarsani        Admission Date Admission Type Admitting Provider Attending Provider Department, Room/Bed    20 Emergency RayMack MD  [address], [address]    Discharge Date Discharge Disposition Discharge Destination        2020 Skilled Nursing Facility (DC - External)              Attending Provider: (none)   Allergies: Adhesive Tape, Sulfa Antibiotics    Isolation: Enh Drop/Con   Infection: COVID (confirmed) (20)   Code Status: Prior    Ht: 170.2 cm (67.01\")   Wt: 53.5 kg (118 lb)    Admission Cmt: None   Principal Problem: COVID-19 virus infection [U07.1]                 Active Insurance as of 2020     Primary Coverage     Payor Plan Insurance Group Employer/Plan Group    [facility] MEDICARE REPLACEMENT [employer] MEDICARE REPLACEMENT      Payor Plan Address Payor Plan Phone Number Payor Plan Fax Number Effective Dates    [address] [address] [phone]  2020 - None Entered    [facility] [id number]       Subscriber Name Subscriber Birth Date Member ID       Ena Lima 1940 [id number]                 Emergency Contacts      (Rel.) Home Phone Work Phone Mobile Phone    RICARDO RAMIREZ (Guardian) [phone] -- --               Discharge Summary      Betito Middleton MD at 20 1351                                                                             PHYSICIAN DISCHARGE SUMMARY                                                                        [facility]    Patient Identification:  Name: Ena Lima  Age: 80 y.o.  Sex: female  :  1940  MRN: [id number]  Primary Care Physician: Tania Lam " LIA Garcia    Admit date: 12/8/2020  Discharge date and time: 12/12/2020     Discharged Condition: good    Discharge Diagnoses:  Hypernatremia: Secondary to severe dehydration.  Resolved.    Dehydration:  Secondary to poor oral intake and diuretics.  Resolved.  Hypotension: Secondary to dehydration.  Resolved.  Acute cystitis without hematuria:     COVID-19 virus infection: Clinically asymptomatic.     Metabolic encephalopathy:      Dementia without behavioral disturbance  Elevated LFTs:  Improving.      Hospital Course:  Pleasant 80-year-old female with advanced dementia is transferred this facility due to increasing confusion.  Please H&P for full details.  On presentation she is severely dehydrated with a marked hypernatremia and elevated BUN and creatinine.  She also tested positive for COVID-19 but she was afebrile, chest x-ray with no infiltrates, good room air O2 sats as far as we can tell is asymptomatic throughout hospitalization for this.  We did follow inflammatory parameters which have been improving nicely.  Concerning dehydration.  She was hydrated initially aggressively emergency room then put on half-normal saline and sodium levels followed closely.  The did start to creep back up again transiently and her IV fluids switched over to D5 W for a while and then back to half-normal saline.  Sodium levels have now normalized.  In addition her BUN/creatinine are now normal.  With hydration she did perk up.  Baseline is not known but I suspect at this point she is back to baseline.  Noted that there was a transient bump in her transaminase levels which is also improving on a daily basis.  At this point then she will be discharge remainder treatment follow back to the memory care unit.  I would advise that her Lasix be kept on hold and that she have assistance with each meal.      Consults:     Consults     Date and Time Order Name Status Description    12/8/2020 2050 MARIVEL (on-call MD unless specified)  Details Completed             Discharge Exam:  Well-developed but very thin and frail female in no apparent distress.    Lungs are clear to auscultation with good air movement.  Heart regular rate and rhythm.  Abdomen benign.  Extremities no clubbing cyanosis edema.  She is asleep on entering the room but awakens very easily.  Has very reasonable eye contact but communicates very little.  She is cooperative with exam.    Patient was wearing facemask when I entered the room and throughout our encounter.  I wore protective equipment throughout this patient encounter including a face mask and gloves.  Hand hygiene was performed before donning protective equipment and after removal when leaving the room.     Disposition:  Skilled nursing facility    Patient Instructions:      Discharge Medications      Continue These Medications      Instructions Start Date   acetaminophen 500 MG tablet  Commonly known as: TYLENOL   1,000 mg, Oral, 3 Times Daily PRN      divalproex 250 MG DR tablet  Commonly known as: DEPAKOTE   250 mg, Oral, Every Night at Bedtime      folic acid 1 MG tablet  Commonly known as: FOLVITE   1 mg, Oral, Daily      mirtazapine 15 MG tablet  Commonly known as: REMERON   7.5 mg, Oral, Nightly      OLANZapine 10 MG tablet  Commonly known as: zyPREXA   10 mg, Oral, Every Night at Bedtime      vitamin B-12 1000 MCG tablet  Commonly known as: CYANOCOBALAMIN   1,000 mcg, Oral, Daily      zinc oxide 20 % ointment   Topical, 2 Times Daily, To coccyx          Stop These Medications    fexofenadine 180 MG tablet  Commonly known as: ALLEGRA     furosemide 20 MG tablet  Commonly known as: LASIX     potassium chloride 10 MEQ CR capsule  Commonly known as: MICRO-K          Diet Instructions     Diet: Dysphagia; Thin Liquids, No Restrictions; Pureed      Discharge Diet: Dysphagia    Fluid Consistency: Thin Liquids, No Restrictions    Pureed Options: Pureed        No future appointments.  Additional Instructions for the  Follow-ups that You Need to Schedule     Discharge Follow-up with PCP   As directed       Currently Documented PCP:    Tania Lam APRN    PCP Phone Number:    [phone]     Follow Up Details: 1 week           Follow-up Information     Tania Lam APRN .    Specialty: Family Medicine  Why: 1 week  Contact information:  [address]  [address]  [place] [address]  [phone]                 Discharge Order (From admission, onward)     Start     Ordered    12/12/20 1349  Discharge patient  Once     Expected Discharge Date: 12/12/20    Discharge Disposition: Skilled Nursing Facility (DC - External)    Physician of Record for Attribution - Please select from Treatment Team: BETITO MIDDLETON [[id number]]    Review needed by CMO to determine Physician of Record: No       Question Answer Comment   Physician of Record for Attribution - Please select from Treatment Team BETITO MIDDLETON    Review needed by CMO to determine Physician of Record No        12/12/20 1351                  Total time spent discharging patient including evaluation,post hospitalization follow up,  medication and post hospitalization instructions and education total time exceeds 30 minutes.    Signed:  Betito Middleton MD  12/12/2020  13:51 EST    EMR Dragon/Transcription disclaimer:   Much of this encounter note is an electronic transcription/translation of spoken language to printed text. The electronic translation of spoken language may permit erroneous, or at times, nonsensical words or phrases to be inadvertently transcribed; Although I have reviewed the note for such errors, some may still exist.       Electronically signed by Betito Middleton MD at 12/12/20 7268

## 2020-12-14 NOTE — PROGRESS NOTES
Case Management Discharge Note      Final Note: Return to [facility].    Provided Post Acute Provider List?: N/A  N/A Provider List Comment: Not needed to return to [facility]  Provided Post Acute Provider Quality & Resource List?: N/A  N/A Quality & Resource List Comment: Not needed to return to [facility]    Selected Continued Care - Discharged on 12/12/2020 Admission date: 12/8/2020 - Discharge disposition: Skilled Nursing Facility (DC - External)    Destination    No services have been selected for the patient.              Durable Medical Equipment    No services have been selected for the patient.              Dialysis/Infusion    No services have been selected for the patient.              Home Medical Care    No services have been selected for the patient.              Therapy    No services have been selected for the patient.              Community Resources    No services have been selected for the patient.                  Transportation Services  Private: Car    Final Discharge Disposition Code: 01 - home or self-care

## 2022-05-22 NOTE — ED NOTES
Bed: 11  Expected date:   Expected time:   Means of arrival:   Comments:  Umang odom 89/f margaux [place]   Pt is now in enhanced droplet precautions. This RN was wearing mask and goggles in room initially but will now wear full PPE.      Abbi Downey, RN  12/08/20 9864